# Patient Record
Sex: FEMALE | Race: WHITE | NOT HISPANIC OR LATINO | Employment: OTHER | URBAN - METROPOLITAN AREA
[De-identification: names, ages, dates, MRNs, and addresses within clinical notes are randomized per-mention and may not be internally consistent; named-entity substitution may affect disease eponyms.]

---

## 2019-08-31 ENCOUNTER — APPOINTMENT (OUTPATIENT)
Dept: CT IMAGING | Facility: HOSPITAL | Age: 74
End: 2019-08-31

## 2019-08-31 ENCOUNTER — HOSPITAL ENCOUNTER (INPATIENT)
Facility: HOSPITAL | Age: 74
LOS: 4 days | End: 2019-09-04
Attending: EMERGENCY MEDICINE | Admitting: HOSPITALIST

## 2019-08-31 ENCOUNTER — APPOINTMENT (OUTPATIENT)
Dept: GENERAL RADIOLOGY | Facility: HOSPITAL | Age: 74
End: 2019-08-31

## 2019-08-31 DIAGNOSIS — R44.3 HALLUCINATIONS: Primary | ICD-10-CM

## 2019-08-31 DIAGNOSIS — E87.20 METABOLIC ACIDOSIS: ICD-10-CM

## 2019-08-31 DIAGNOSIS — N17.9 ACUTE RENAL FAILURE, UNSPECIFIED ACUTE RENAL FAILURE TYPE (HCC): ICD-10-CM

## 2019-08-31 PROBLEM — R56.9 SEIZURES (HCC): Chronic | Status: ACTIVE | Noted: 2019-08-31

## 2019-08-31 PROBLEM — I25.10 CORONARY ARTERY DISEASE: Chronic | Status: ACTIVE | Noted: 2019-08-31

## 2019-08-31 PROBLEM — S09.90XS: Status: ACTIVE | Noted: 2019-08-31

## 2019-08-31 PROBLEM — E03.9 HYPOTHYROIDISM (ACQUIRED): Chronic | Status: ACTIVE | Noted: 2019-08-31

## 2019-08-31 PROBLEM — G89.29 CHRONIC BACK PAIN: Chronic | Status: ACTIVE | Noted: 2019-08-31

## 2019-08-31 PROBLEM — E87.1 HYPONATREMIA: Status: ACTIVE | Noted: 2019-08-31

## 2019-08-31 PROBLEM — M54.9 CHRONIC BACK PAIN: Chronic | Status: ACTIVE | Noted: 2019-08-31

## 2019-08-31 PROBLEM — R53.1 WEAKNESS: Status: ACTIVE | Noted: 2019-08-31

## 2019-08-31 PROBLEM — G93.41 METABOLIC ENCEPHALOPATHY: Status: ACTIVE | Noted: 2019-08-31

## 2019-08-31 LAB
ALBUMIN SERPL-MCNC: 4.6 G/DL (ref 3.5–5.2)
ALBUMIN/GLOB SERPL: 1.6 G/DL
ALP SERPL-CCNC: 94 U/L (ref 39–117)
ALT SERPL W P-5'-P-CCNC: 6 U/L (ref 1–33)
ANION GAP SERPL CALCULATED.3IONS-SCNC: 15.2 MMOL/L (ref 5–15)
ANION GAP SERPL CALCULATED.3IONS-SCNC: 18.1 MMOL/L (ref 5–15)
ARTERIAL PATENCY WRIST A: POSITIVE
ARTERIAL PATENCY WRIST A: POSITIVE
AST SERPL-CCNC: 16 U/L (ref 1–32)
ATMOSPHERIC PRESS: 752.6 MMHG
ATMOSPHERIC PRESS: 753.6 MMHG
BACTERIA UR QL AUTO: ABNORMAL /HPF
BASE EXCESS BLDA CALC-SCNC: -13.1 MMOL/L (ref 0–2)
BASE EXCESS BLDA CALC-SCNC: -13.3 MMOL/L (ref 0–2)
BASOPHILS # BLD AUTO: 0 10*3/MM3 (ref 0–0.2)
BASOPHILS NFR BLD AUTO: 0 % (ref 0–1.5)
BDY SITE: ABNORMAL
BDY SITE: ABNORMAL
BILIRUB SERPL-MCNC: 0.2 MG/DL (ref 0.2–1.2)
BILIRUB UR QL STRIP: NEGATIVE
BUN BLD-MCNC: 44 MG/DL (ref 8–23)
BUN BLD-MCNC: 48 MG/DL (ref 8–23)
BUN/CREAT SERPL: 16.8 (ref 7–25)
BUN/CREAT SERPL: 19.6 (ref 7–25)
CALCIUM SPEC-SCNC: 8.4 MG/DL (ref 8.6–10.5)
CALCIUM SPEC-SCNC: 9.2 MG/DL (ref 8.6–10.5)
CHLORIDE SERPL-SCNC: 102 MMOL/L (ref 98–107)
CHLORIDE SERPL-SCNC: 106 MMOL/L (ref 98–107)
CLARITY UR: CLEAR
CO2 SERPL-SCNC: 12.8 MMOL/L (ref 22–29)
CO2 SERPL-SCNC: 13.9 MMOL/L (ref 22–29)
COLOR UR: YELLOW
CREAT BLD-MCNC: 2.25 MG/DL (ref 0.57–1)
CREAT BLD-MCNC: 2.85 MG/DL (ref 0.57–1)
D-LACTATE SERPL-SCNC: 1.5 MMOL/L (ref 0.5–2)
DEPRECATED RDW RBC AUTO: 46.7 FL (ref 37–54)
EOSINOPHIL # BLD AUTO: 0 10*3/MM3 (ref 0–0.4)
EOSINOPHIL NFR BLD AUTO: 0 % (ref 0.3–6.2)
ERYTHROCYTE [DISTWIDTH] IN BLOOD BY AUTOMATED COUNT: 14.1 % (ref 12.3–15.4)
GFR SERPL CREATININE-BSD FRML MDRD: 16 ML/MIN/1.73
GFR SERPL CREATININE-BSD FRML MDRD: 21 ML/MIN/1.73
GLOBULIN UR ELPH-MCNC: 2.8 GM/DL
GLUCOSE BLD-MCNC: 101 MG/DL (ref 65–99)
GLUCOSE BLD-MCNC: 98 MG/DL (ref 65–99)
GLUCOSE UR STRIP-MCNC: NEGATIVE MG/DL
HCO3 BLDA-SCNC: 10.4 MMOL/L (ref 22–28)
HCO3 BLDA-SCNC: 11.1 MMOL/L (ref 22–28)
HCT VFR BLD AUTO: 43.7 % (ref 34–46.6)
HGB BLD-MCNC: 14.2 G/DL (ref 12–15.9)
HGB UR QL STRIP.AUTO: NEGATIVE
HYALINE CASTS UR QL AUTO: ABNORMAL /LPF
IMM GRANULOCYTES # BLD AUTO: 0.02 10*3/MM3 (ref 0–0.05)
IMM GRANULOCYTES NFR BLD AUTO: 0.3 % (ref 0–0.5)
KETONES UR QL STRIP: NEGATIVE
LEUKOCYTE ESTERASE UR QL STRIP.AUTO: NEGATIVE
LYMPHOCYTES # BLD AUTO: 1.22 10*3/MM3 (ref 0.7–3.1)
LYMPHOCYTES NFR BLD AUTO: 19.9 % (ref 19.6–45.3)
MAGNESIUM SERPL-MCNC: 1.9 MG/DL (ref 1.6–2.4)
MCH RBC QN AUTO: 29.2 PG (ref 26.6–33)
MCHC RBC AUTO-ENTMCNC: 32.5 G/DL (ref 31.5–35.7)
MCV RBC AUTO: 89.7 FL (ref 79–97)
MODALITY: ABNORMAL
MODALITY: ABNORMAL
MONOCYTES # BLD AUTO: 0.69 10*3/MM3 (ref 0.1–0.9)
MONOCYTES NFR BLD AUTO: 11.3 % (ref 5–12)
NEUTROPHILS # BLD AUTO: 4.19 10*3/MM3 (ref 1.7–7)
NEUTROPHILS NFR BLD AUTO: 68.5 % (ref 42.7–76)
NITRITE UR QL STRIP: NEGATIVE
NRBC BLD AUTO-RTO: 0 /100 WBC (ref 0–0.2)
PCO2 BLDA: 19.7 MM HG (ref 35–45)
PCO2 BLDA: 21.7 MM HG (ref 35–45)
PH BLDA: 7.32 PH UNITS (ref 7.35–7.45)
PH BLDA: 7.33 PH UNITS (ref 7.35–7.45)
PH UR STRIP.AUTO: 5.5 [PH] (ref 5–8)
PLATELET # BLD AUTO: 213 10*3/MM3 (ref 140–450)
PMV BLD AUTO: 10.9 FL (ref 6–12)
PO2 BLDA: 111.2 MM HG (ref 80–100)
PO2 BLDA: 94.6 MM HG (ref 80–100)
POTASSIUM BLD-SCNC: 4.2 MMOL/L (ref 3.5–5.2)
POTASSIUM BLD-SCNC: 5.1 MMOL/L (ref 3.5–5.2)
PROT SERPL-MCNC: 7.4 G/DL (ref 6–8.5)
PROT UR QL STRIP: ABNORMAL
RBC # BLD AUTO: 4.87 10*6/MM3 (ref 3.77–5.28)
RBC # UR: ABNORMAL /HPF
REF LAB TEST METHOD: ABNORMAL
SAO2 % BLDCOA: 97.1 % (ref 92–99)
SAO2 % BLDCOA: 98.1 % (ref 92–99)
SODIUM BLD-SCNC: 134 MMOL/L (ref 136–145)
SODIUM BLD-SCNC: 134 MMOL/L (ref 136–145)
SP GR UR STRIP: 1.02 (ref 1–1.03)
SQUAMOUS #/AREA URNS HPF: ABNORMAL /HPF
T4 FREE SERPL-MCNC: 1.7 NG/DL (ref 0.93–1.7)
TOTAL RATE: 16 BREATHS/MINUTE
TOTAL RATE: 16 BREATHS/MINUTE
TROPONIN T SERPL-MCNC: <0.01 NG/ML (ref 0–0.03)
TSH SERPL DL<=0.05 MIU/L-ACNC: 0.04 UIU/ML (ref 0.27–4.2)
UROBILINOGEN UR QL STRIP: ABNORMAL
WBC NRBC COR # BLD: 6.12 10*3/MM3 (ref 3.4–10.8)
WBC UR QL AUTO: ABNORMAL /HPF
YEAST URNS QL MICRO: ABNORMAL /HPF

## 2019-08-31 PROCEDURE — 80053 COMPREHEN METABOLIC PANEL: CPT | Performed by: EMERGENCY MEDICINE

## 2019-08-31 PROCEDURE — 83735 ASSAY OF MAGNESIUM: CPT | Performed by: EMERGENCY MEDICINE

## 2019-08-31 PROCEDURE — 36600 WITHDRAWAL OF ARTERIAL BLOOD: CPT

## 2019-08-31 PROCEDURE — 84484 ASSAY OF TROPONIN QUANT: CPT | Performed by: EMERGENCY MEDICINE

## 2019-08-31 PROCEDURE — 36415 COLL VENOUS BLD VENIPUNCTURE: CPT

## 2019-08-31 PROCEDURE — 81001 URINALYSIS AUTO W/SCOPE: CPT | Performed by: EMERGENCY MEDICINE

## 2019-08-31 PROCEDURE — 70450 CT HEAD/BRAIN W/O DYE: CPT

## 2019-08-31 PROCEDURE — 93005 ELECTROCARDIOGRAM TRACING: CPT | Performed by: EMERGENCY MEDICINE

## 2019-08-31 PROCEDURE — 84443 ASSAY THYROID STIM HORMONE: CPT | Performed by: EMERGENCY MEDICINE

## 2019-08-31 PROCEDURE — 84439 ASSAY OF FREE THYROXINE: CPT | Performed by: EMERGENCY MEDICINE

## 2019-08-31 PROCEDURE — 83605 ASSAY OF LACTIC ACID: CPT | Performed by: EMERGENCY MEDICINE

## 2019-08-31 PROCEDURE — 36415 COLL VENOUS BLD VENIPUNCTURE: CPT | Performed by: HOSPITALIST

## 2019-08-31 PROCEDURE — 71045 X-RAY EXAM CHEST 1 VIEW: CPT

## 2019-08-31 PROCEDURE — 82803 BLOOD GASES ANY COMBINATION: CPT

## 2019-08-31 PROCEDURE — 99284 EMERGENCY DEPT VISIT MOD MDM: CPT

## 2019-08-31 PROCEDURE — 93010 ELECTROCARDIOGRAM REPORT: CPT | Performed by: INTERNAL MEDICINE

## 2019-08-31 PROCEDURE — 85025 COMPLETE CBC W/AUTO DIFF WBC: CPT | Performed by: EMERGENCY MEDICINE

## 2019-08-31 RX ORDER — PRAMIPEXOLE DIHYDROCHLORIDE 0.12 MG/1
0.12 TABLET ORAL 2 TIMES DAILY
COMMUNITY
End: 2019-09-04 | Stop reason: HOSPADM

## 2019-08-31 RX ORDER — ACETAMINOPHEN 650 MG/1
650 SUPPOSITORY RECTAL EVERY 4 HOURS PRN
Status: DISCONTINUED | OUTPATIENT
Start: 2019-08-31 | End: 2019-09-04 | Stop reason: HOSPADM

## 2019-08-31 RX ORDER — ZONISAMIDE 100 MG/1
200 CAPSULE ORAL NIGHTLY
COMMUNITY

## 2019-08-31 RX ORDER — QUETIAPINE FUMARATE 25 MG/1
25 TABLET, FILM COATED ORAL NIGHTLY
Status: DISCONTINUED | OUTPATIENT
Start: 2019-08-31 | End: 2019-09-01

## 2019-08-31 RX ORDER — ALBUTEROL SULFATE 90 UG/1
2 AEROSOL, METERED RESPIRATORY (INHALATION) EVERY 6 HOURS PRN
COMMUNITY

## 2019-08-31 RX ORDER — ONDANSETRON 2 MG/ML
4 INJECTION INTRAMUSCULAR; INTRAVENOUS EVERY 6 HOURS PRN
Status: DISCONTINUED | OUTPATIENT
Start: 2019-08-31 | End: 2019-09-04 | Stop reason: HOSPADM

## 2019-08-31 RX ORDER — ZONISAMIDE 100 MG/1
200 CAPSULE ORAL NIGHTLY
Status: DISCONTINUED | OUTPATIENT
Start: 2019-08-31 | End: 2019-09-01

## 2019-08-31 RX ORDER — SODIUM CHLORIDE 0.9 % (FLUSH) 0.9 %
10 SYRINGE (ML) INJECTION EVERY 12 HOURS SCHEDULED
Status: DISCONTINUED | OUTPATIENT
Start: 2019-08-31 | End: 2019-09-04 | Stop reason: HOSPADM

## 2019-08-31 RX ORDER — ATORVASTATIN CALCIUM 10 MG/1
10 TABLET, FILM COATED ORAL DAILY
COMMUNITY

## 2019-08-31 RX ORDER — NITROGLYCERIN 0.4 MG/1
0.4 TABLET SUBLINGUAL
COMMUNITY

## 2019-08-31 RX ORDER — ASPIRIN 81 MG/1
81 TABLET ORAL DAILY
COMMUNITY

## 2019-08-31 RX ORDER — MONTELUKAST SODIUM 10 MG/1
10 TABLET ORAL NIGHTLY
COMMUNITY

## 2019-08-31 RX ORDER — CARVEDILOL 3.12 MG/1
3.12 TABLET ORAL 2 TIMES DAILY WITH MEALS
Status: DISCONTINUED | OUTPATIENT
Start: 2019-08-31 | End: 2019-09-03

## 2019-08-31 RX ORDER — SODIUM CHLORIDE 0.9 % (FLUSH) 0.9 %
10 SYRINGE (ML) INJECTION AS NEEDED
Status: DISCONTINUED | OUTPATIENT
Start: 2019-08-31 | End: 2019-09-04 | Stop reason: HOSPADM

## 2019-08-31 RX ORDER — LEVOTHYROXINE SODIUM 88 UG/1
88 TABLET ORAL
Status: DISCONTINUED | OUTPATIENT
Start: 2019-09-01 | End: 2019-09-01

## 2019-08-31 RX ORDER — SODIUM CHLORIDE 9 MG/ML
125 INJECTION, SOLUTION INTRAVENOUS CONTINUOUS
Status: DISCONTINUED | OUTPATIENT
Start: 2019-08-31 | End: 2019-09-01

## 2019-08-31 RX ORDER — SULFAMETHOXAZOLE AND TRIMETHOPRIM 800; 160 MG/1; MG/1
1 TABLET ORAL 2 TIMES DAILY
COMMUNITY
Start: 2019-08-28 | End: 2019-09-04 | Stop reason: HOSPADM

## 2019-08-31 RX ORDER — ATORVASTATIN CALCIUM 10 MG/1
10 TABLET, FILM COATED ORAL DAILY
Status: DISCONTINUED | OUTPATIENT
Start: 2019-09-01 | End: 2019-09-04 | Stop reason: HOSPADM

## 2019-08-31 RX ORDER — ACETAMINOPHEN 325 MG/1
650 TABLET ORAL EVERY 4 HOURS PRN
Status: DISCONTINUED | OUTPATIENT
Start: 2019-08-31 | End: 2019-09-04 | Stop reason: HOSPADM

## 2019-08-31 RX ORDER — QUETIAPINE FUMARATE 25 MG/1
25 TABLET, FILM COATED ORAL NIGHTLY
Status: ON HOLD | COMMUNITY
End: 2019-09-04 | Stop reason: SDUPTHER

## 2019-08-31 RX ORDER — ASPIRIN 81 MG/1
81 TABLET ORAL DAILY
Status: DISCONTINUED | OUTPATIENT
Start: 2019-09-01 | End: 2019-09-04 | Stop reason: HOSPADM

## 2019-08-31 RX ORDER — ACETAMINOPHEN 160 MG/5ML
650 SOLUTION ORAL EVERY 4 HOURS PRN
Status: DISCONTINUED | OUTPATIENT
Start: 2019-08-31 | End: 2019-09-04 | Stop reason: HOSPADM

## 2019-08-31 RX ORDER — CARVEDILOL 3.12 MG/1
3.12 TABLET ORAL 2 TIMES DAILY WITH MEALS
COMMUNITY
End: 2019-09-04 | Stop reason: HOSPADM

## 2019-08-31 RX ORDER — LEVOTHYROXINE SODIUM 0.1 MG/1
100 TABLET ORAL DAILY
COMMUNITY
End: 2019-09-04 | Stop reason: HOSPADM

## 2019-08-31 RX ORDER — ERGOCALCIFEROL 1.25 MG/1
50000 CAPSULE ORAL WEEKLY
COMMUNITY

## 2019-08-31 RX ORDER — SODIUM CHLORIDE 9 MG/ML
100 INJECTION, SOLUTION INTRAVENOUS CONTINUOUS
Status: DISCONTINUED | OUTPATIENT
Start: 2019-08-31 | End: 2019-09-02

## 2019-08-31 RX ORDER — POTASSIUM CHLORIDE 750 MG/1
20 CAPSULE, EXTENDED RELEASE ORAL DAILY
Status: DISCONTINUED | OUTPATIENT
Start: 2019-09-01 | End: 2019-09-04 | Stop reason: HOSPADM

## 2019-08-31 RX ORDER — POTASSIUM CHLORIDE 750 MG/1
20 TABLET, FILM COATED, EXTENDED RELEASE ORAL DAILY
COMMUNITY

## 2019-08-31 RX ORDER — VENLAFAXINE HYDROCHLORIDE 37.5 MG/1
37.5 CAPSULE, EXTENDED RELEASE ORAL DAILY
Status: DISCONTINUED | OUTPATIENT
Start: 2019-09-01 | End: 2019-09-01

## 2019-08-31 RX ORDER — PANTOPRAZOLE SODIUM 40 MG/1
40 TABLET, DELAYED RELEASE ORAL DAILY
COMMUNITY

## 2019-08-31 RX ADMIN — ZONISAMIDE 200 MG: 100 CAPSULE ORAL at 22:04

## 2019-08-31 RX ADMIN — QUETIAPINE FUMARATE 25 MG: 25 TABLET ORAL at 21:53

## 2019-08-31 RX ADMIN — SODIUM CHLORIDE, PRESERVATIVE FREE 10 ML: 5 INJECTION INTRAVENOUS at 20:34

## 2019-08-31 RX ADMIN — SODIUM CHLORIDE 500 ML: 9 INJECTION, SOLUTION INTRAVENOUS at 17:30

## 2019-08-31 RX ADMIN — CARVEDILOL 3.12 MG: 3.12 TABLET, FILM COATED ORAL at 21:53

## 2019-08-31 RX ADMIN — SODIUM CHLORIDE 125 ML/HR: 9 INJECTION, SOLUTION INTRAVENOUS at 20:33

## 2019-08-31 RX ADMIN — SODIUM CHLORIDE 125 ML/HR: 9 INJECTION, SOLUTION INTRAVENOUS at 17:30

## 2019-09-01 LAB
ALBUMIN SERPL-MCNC: 4.3 G/DL (ref 3.5–5.2)
ALBUMIN/GLOB SERPL: 2 G/DL
ALP SERPL-CCNC: 89 U/L (ref 39–117)
ALT SERPL W P-5'-P-CCNC: <5 U/L (ref 1–33)
ANION GAP SERPL CALCULATED.3IONS-SCNC: 10.5 MMOL/L (ref 5–15)
AST SERPL-CCNC: 12 U/L (ref 1–32)
BASOPHILS # BLD AUTO: 0 10*3/MM3 (ref 0–0.2)
BASOPHILS NFR BLD AUTO: 0 % (ref 0–1.5)
BILIRUB SERPL-MCNC: 0.2 MG/DL (ref 0.2–1.2)
BUN BLD-MCNC: 39 MG/DL (ref 8–23)
BUN/CREAT SERPL: 21.5 (ref 7–25)
CALCIUM SPEC-SCNC: 8.6 MG/DL (ref 8.6–10.5)
CHLORIDE SERPL-SCNC: 110 MMOL/L (ref 98–107)
CO2 SERPL-SCNC: 13.5 MMOL/L (ref 22–29)
CREAT BLD-MCNC: 1.81 MG/DL (ref 0.57–1)
DEPRECATED RDW RBC AUTO: 48.3 FL (ref 37–54)
EOSINOPHIL # BLD AUTO: 0 10*3/MM3 (ref 0–0.4)
EOSINOPHIL NFR BLD AUTO: 0 % (ref 0.3–6.2)
ERYTHROCYTE [DISTWIDTH] IN BLOOD BY AUTOMATED COUNT: 14.4 % (ref 12.3–15.4)
GFR SERPL CREATININE-BSD FRML MDRD: 28 ML/MIN/1.73
GLOBULIN UR ELPH-MCNC: 2.1 GM/DL
GLUCOSE BLD-MCNC: 92 MG/DL (ref 65–99)
HCT VFR BLD AUTO: 39.4 % (ref 34–46.6)
HGB BLD-MCNC: 12.5 G/DL (ref 12–15.9)
IMM GRANULOCYTES # BLD AUTO: 0.02 10*3/MM3 (ref 0–0.05)
IMM GRANULOCYTES NFR BLD AUTO: 0.4 % (ref 0–0.5)
LYMPHOCYTES # BLD AUTO: 1.19 10*3/MM3 (ref 0.7–3.1)
LYMPHOCYTES NFR BLD AUTO: 24.1 % (ref 19.6–45.3)
MCH RBC QN AUTO: 29.1 PG (ref 26.6–33)
MCHC RBC AUTO-ENTMCNC: 31.7 G/DL (ref 31.5–35.7)
MCV RBC AUTO: 91.8 FL (ref 79–97)
MONOCYTES # BLD AUTO: 0.58 10*3/MM3 (ref 0.1–0.9)
MONOCYTES NFR BLD AUTO: 11.8 % (ref 5–12)
NEUTROPHILS # BLD AUTO: 3.14 10*3/MM3 (ref 1.7–7)
NEUTROPHILS NFR BLD AUTO: 63.7 % (ref 42.7–76)
NRBC BLD AUTO-RTO: 0 /100 WBC (ref 0–0.2)
PLATELET # BLD AUTO: 183 10*3/MM3 (ref 140–450)
PMV BLD AUTO: 11.1 FL (ref 6–12)
POTASSIUM BLD-SCNC: 4.3 MMOL/L (ref 3.5–5.2)
PROT SERPL-MCNC: 6.4 G/DL (ref 6–8.5)
RBC # BLD AUTO: 4.29 10*6/MM3 (ref 3.77–5.28)
SODIUM BLD-SCNC: 134 MMOL/L (ref 136–145)
WBC NRBC COR # BLD: 4.93 10*3/MM3 (ref 3.4–10.8)

## 2019-09-01 PROCEDURE — 99223 1ST HOSP IP/OBS HIGH 75: CPT | Performed by: PSYCHIATRY & NEUROLOGY

## 2019-09-01 PROCEDURE — 80053 COMPREHEN METABOLIC PANEL: CPT | Performed by: HOSPITALIST

## 2019-09-01 PROCEDURE — 85025 COMPLETE CBC W/AUTO DIFF WBC: CPT | Performed by: HOSPITALIST

## 2019-09-01 RX ORDER — LEVOTHYROXINE SODIUM 88 UG/1
88 TABLET ORAL
Status: DISCONTINUED | OUTPATIENT
Start: 2019-09-02 | End: 2019-09-04 | Stop reason: HOSPADM

## 2019-09-01 RX ORDER — ARIPIPRAZOLE 5 MG/1
5 TABLET ORAL
Status: DISCONTINUED | OUTPATIENT
Start: 2019-09-01 | End: 2019-09-02

## 2019-09-01 RX ORDER — LEVOTHYROXINE SODIUM 0.07 MG/1
75 TABLET ORAL
Status: DISCONTINUED | OUTPATIENT
Start: 2019-09-02 | End: 2019-09-01

## 2019-09-01 RX ADMIN — SODIUM CHLORIDE, PRESERVATIVE FREE 10 ML: 5 INJECTION INTRAVENOUS at 20:16

## 2019-09-01 RX ADMIN — ARIPIPRAZOLE 5 MG: 5 TABLET ORAL at 20:16

## 2019-09-01 RX ADMIN — ACETAMINOPHEN 650 MG: 325 TABLET, FILM COATED ORAL at 08:35

## 2019-09-01 RX ADMIN — CARVEDILOL 3.12 MG: 3.12 TABLET, FILM COATED ORAL at 17:58

## 2019-09-01 RX ADMIN — SODIUM CHLORIDE 125 ML/HR: 9 INJECTION, SOLUTION INTRAVENOUS at 06:08

## 2019-09-01 RX ADMIN — POTASSIUM CHLORIDE 20 MEQ: 750 CAPSULE, EXTENDED RELEASE ORAL at 08:32

## 2019-09-01 RX ADMIN — ASPIRIN 81 MG: 81 TABLET, COATED ORAL at 08:33

## 2019-09-01 RX ADMIN — CARVEDILOL 3.12 MG: 3.12 TABLET, FILM COATED ORAL at 08:32

## 2019-09-01 RX ADMIN — SODIUM CHLORIDE, PRESERVATIVE FREE 10 ML: 5 INJECTION INTRAVENOUS at 09:09

## 2019-09-01 RX ADMIN — ATORVASTATIN CALCIUM 10 MG: 10 TABLET, FILM COATED ORAL at 08:32

## 2019-09-01 RX ADMIN — VENLAFAXINE HYDROCHLORIDE 37.5 MG: 37.5 CAPSULE, EXTENDED RELEASE ORAL at 08:33

## 2019-09-01 RX ADMIN — SODIUM CHLORIDE 100 ML/HR: 9 INJECTION, SOLUTION INTRAVENOUS at 14:32

## 2019-09-01 RX ADMIN — LEVOTHYROXINE SODIUM 88 MCG: 88 TABLET ORAL at 06:08

## 2019-09-01 NOTE — PLAN OF CARE
Problem: Patient Care Overview  Goal: Plan of Care Review  Outcome: Ongoing (interventions implemented as appropriate)   09/01/19 2369   Coping/Psychosocial   Plan of Care Reviewed With patient   Plan of Care Review   Progress improving   OTHER   Outcome Summary Pt was found to have UTI and placed on Bactrim 3 days ago (by her PCP) about the same time hallucinations started. Pt back to base line with improving labs. will CTM and provide care.

## 2019-09-01 NOTE — CONSULTS
Patient Identification:  NAME:  Lana Frankel  Age:  73 y.o.   Sex:  female   :  1945   MRN:  [id number]       Chief complaint: Does not have one/ reason for consult hallucinations    History of present illness: This patient is 73-year-old right-handed white female with history of seizure supposedly in the past myocardial infarction coronary artery disease she comes in after being found confused she apparently has had a fall striking the front part of her head.  She had a CT by my independent I will review it is normal the head injury is about a week ago she has been having hallucinations quality paranoia and hallucinations duration at least the last several days if not for a week or so.  The daughter thing thinks it may have come on after the head injury although it is not clear what came first the confusion or the fall.  Locations not pertinent quality as described duration presumably over this last week modifying factors so far nothing.      Past medical history:  Past Medical History:   Diagnosis Date   • Chronic back pain    • Coronary artery disease    • Disease of thyroid gland    • Myocardial infarction (CMS/HCC)    • Seizures (CMS/HCC)        Past surgical history:  Past Surgical History:   Procedure Laterality Date   • CHOLECYSTECTOMY     • HYSTERECTOMY     • NASAL SEPTUM SURGERY     • REPLACEMENT TOTAL KNEE     • THYROID SURGERY         Allergies:  Penicillins    Home medications:  Medications Prior to Admission   Medication Sig Dispense Refill Last Dose   • albuterol sulfate  (90 Base) MCG/ACT inhaler Inhale 2 puffs Every 6 (Six) Hours As Needed for Wheezing.      • aspirin 81 MG EC tablet Take 81 mg by mouth Daily.      • atorvastatin (LIPITOR) 10 MG tablet Take 10 mg by mouth Daily.      • carvedilol (COREG) 3.125 MG tablet Take 3.125 mg by mouth 2 (Two) Times a Day With Meals.      • ergocalciferol (DRISDOL) 8000 UNIT/ML drops Take  by mouth Daily.      • levothyroxine (SYNTHROID,  LEVOTHROID) 100 MCG tablet Take 100 mcg by mouth Daily.      • montelukast (SINGULAIR) 10 MG tablet Take 10 mg by mouth Every Night.      • nitroglycerin (NITROSTAT) 0.4 MG SL tablet Place 0.4 mg under the tongue Every 5 (Five) Minutes As Needed for Chest Pain. Take no more than 3 doses in 15 minutes.      • pantoprazole (PROTONIX) 40 MG EC tablet Take 40 mg by mouth Daily.      • potassium chloride (K-DUR,KLOR-CON) 10 MEQ ER tablet Take 20 mEq by mouth Daily.      • pramipexole (MIRAPEX) 0.125 MG tablet Take 0.125 mg by mouth 2 (Two) Times a Day.      • QUEtiapine (SEROquel) 25 MG tablet Take 25 mg by mouth Every Night.      • sulfamethoxazole-trimethoprim (BACTRIM DS,SEPTRA DS) 800-160 MG per tablet Take 1 tablet by mouth 2 (Two) Times a Day.      • VENLAFAXINE HCL ER PO Take 50 mg by mouth Daily.      • vitamin D (ERGOCALCIFEROL) 08584 units capsule capsule Take 50,000 Units by mouth 1 (One) Time Per Week.      • zonisamide (ZONEGRAN) 100 MG capsule Take 200 mg by mouth Every Night.           Hospital medications:    ARIPiprazole 5 mg Oral Daily With Dinner   aspirin 81 mg Oral Daily   atorvastatin 10 mg Oral Daily   carvedilol 3.125 mg Oral BID With Meals   [START ON 9/2/2019] levothyroxine 88 mcg Oral Q AM   potassium chloride 20 mEq Oral Daily   sodium chloride 10 mL Intravenous Q12H       sodium chloride 100 mL/hr Last Rate: 100 mL/hr (09/01/19 1432)     •  acetaminophen **OR** acetaminophen **OR** acetaminophen  •  ondansetron  •  sodium chloride    Family history:  History reviewed. No pertinent family history.    Social history:  Social History     Tobacco Use   • Smoking status: Never Smoker   Substance Use Topics   • Alcohol use: No     Frequency: Never   • Drug use: No       Review of systems:    She does not really have any complaints she is a little confused and will not really give an adequate review of systems.  She denies headache hair eyes ears nose throat skin bone joint  lymphatic hematologic  or oncologic complaints no neck pain chest pain abdominal pain bowel bladder incontinence fever chills or rash.  Her daughter is here to concur with most of this she has hyperlipidemia    Objective:  Vitals Ranges:   Temp:  [97.3 °F (36.3 °C)-98.4 °F (36.9 °C)] 97.6 °F (36.4 °C)  Heart Rate:  [69-85] 73  Resp:  [16-18] 16  BP: (125-161)/(54-90) 161/84      Physical Exam:  Awake alert and oriented x2 fund of knowledge poor attention span concentration fair recent remote memory poor language function normal elderly in no distress.  Pupils 1-1/2 constricting to 1 disc retinas were impossible to follow extraocular movements at least appear to be full horizontally in both directions upgaze is okay as well visual fields probably full although she has trouble following that nasolabial folds palate tongue symmetrical she would not follow any other cranial nerve testing.  She does appear to be slightly confused but answered most questions appropriately motor 5 out of 5 x 4 extremities no atrophy fasciculations rigidity bradykinesia reflexes trace throughout symmetrical toes downgoing bilaterally sensation coordinate sensation normal light touch face both arms both legs coordination normal in the upper extremity Station and gait is normal heart regular without murmur neck supple without bruits extremities no clubbing cyanosis edema visual acuity she was able to count fingers at 3 feet    Results review:   I reviewed the patient's new clinical results.    Data review:  Lab Results (last 24 hours)     Procedure Component Value Units Date/Time    Comprehensive Metabolic Panel [[id number]]  (Abnormal) Collected:  09/01/19 0541    Specimen:  Blood Updated:  09/01/19 0630     Glucose 92 mg/dL      BUN 39 mg/dL      Creatinine 1.81 mg/dL      Sodium 134 mmol/L      Potassium 4.3 mmol/L      Chloride 110 mmol/L      CO2 13.5 mmol/L      Calcium 8.6 mg/dL      Total Protein 6.4 g/dL      Albumin 4.30 g/dL      ALT (SGPT) <5 U/L       AST (SGOT) 12 U/L      Alkaline Phosphatase 89 U/L      Total Bilirubin 0.2 mg/dL      eGFR Non African Amer 28 mL/min/1.73      Globulin 2.1 gm/dL      A/G Ratio 2.0 g/dL      BUN/Creatinine Ratio 21.5     Anion Gap 10.5 mmol/L     Narrative:       GFR Normal >60  Chronic Kidney Disease <60  Kidney Failure <15    CBC Auto Differential [[id number]]  (Abnormal) Collected:  09/01/19 0541    Specimen:  Blood Updated:  09/01/19 0620     WBC 4.93 10*3/mm3      RBC 4.29 10*6/mm3      Hemoglobin 12.5 g/dL      Hematocrit 39.4 %      MCV 91.8 fL      MCH 29.1 pg      MCHC 31.7 g/dL      RDW 14.4 %      RDW-SD 48.3 fl      MPV 11.1 fL      Platelets 183 10*3/mm3      Neutrophil % 63.7 %      Lymphocyte % 24.1 %      Monocyte % 11.8 %      Eosinophil % 0.0 %      Basophil % 0.0 %      Immature Grans % 0.4 %      Neutrophils, Absolute 3.14 10*3/mm3      Lymphocytes, Absolute 1.19 10*3/mm3      Monocytes, Absolute 0.58 10*3/mm3      Eosinophils, Absolute 0.00 10*3/mm3      Basophils, Absolute 0.00 10*3/mm3      Immature Grans, Absolute 0.02 10*3/mm3      nRBC 0.0 /100 WBC     Blood Gas, Arterial [[id number]]  (Abnormal) Collected:  08/31/19 2348    Specimen:  Arterial Blood Updated:  08/31/19 2351     Site Arterial: right radial     Jae's Test Positive     pH, Arterial 7.316 pH units      pCO2, Arterial 21.7 mm Hg      pO2, Arterial 111.2 mm Hg      HCO3, Arterial 11.1 mmol/L      Base Excess, Arterial -13.3 mmol/L      O2 Saturation Calculated 98.1 %      Barometric Pressure for Blood Gas 753.6 mmHg      Modality Room Air     Rate 16 Breaths/minute     Basic Metabolic Panel [[id number]]  (Abnormal) Collected:  08/31/19 2253    Specimen:  Blood Updated:  08/31/19 2323     Glucose 98 mg/dL      BUN 44 mg/dL      Creatinine 2.25 mg/dL      Sodium 134 mmol/L      Potassium 4.2 mmol/L      Chloride 106 mmol/L      CO2 12.8 mmol/L      Calcium 8.4 mg/dL      eGFR Non African Amer 21 mL/min/1.73      BUN/Creatinine Ratio 19.6      Anion Gap 15.2 mmol/L     Narrative:       GFR Normal >60  Chronic Kidney Disease <60  Kidney Failure <15           Imaging:  Imaging Results (last 24 hours)     ** No results found for the last 24 hours. **             Assessment and Plan:       CECILE (acute kidney injury) (CMS/HCC)    Hallucinations    Chronic back pain    Coronary artery disease    Hypothyroidism (acquired)    Seizures (CMS/HCC)    Hyponatremia    Metabolic acidosis    Head injury, acute, sequela    Weakness    Metabolic encephalopathy    Metabolic encephalopathy with associated confusion and hallucinations.  She is also postconcussive it is hard to know if she was confused at home and then fell or became more confused after the fall.  She has had a CT of the head which by my independent eyeball review shows no acute stroke and no hemorrhage.  We will discontinue the sonogram discontinue the Effexor and give her Abilify 5 mg at supper and see what she looks like tomorrow thank you      Miles Tinoco MD  09/01/19  6:06 PM

## 2019-09-01 NOTE — PLAN OF CARE
Problem: Patient Care Overview  Goal: Plan of Care Review  Outcome: Ongoing (interventions implemented as appropriate)   09/01/19 1649   Coping/Psychosocial   Plan of Care Reviewed With patient   Plan of Care Review   Progress no change   OTHER   Outcome Summary Pt. admitted for hallucinations, vss, alert and oriented, however still having quite a few hallucinations. Tactile, olfactory, and visual. Will continue to monitor.       Problem: Fall Risk (Adult)  Goal: Identify Related Risk Factors and Signs and Symptoms  Outcome: Outcome(s) achieved Date Met: 09/01/19 09/01/19 1649   Fall Risk (Adult)   Related Risk Factors (Fall Risk) gait/mobility problems;history of falls;environment unfamiliar   Signs and Symptoms (Fall Risk) presence of risk factors     Goal: Absence of Fall  Outcome: Ongoing (interventions implemented as appropriate)   09/01/19 1649   Fall Risk (Adult)   Absence of Fall making progress toward outcome       Problem: Pain, Chronic (Adult)  Goal: Identify Related Risk Factors and Signs and Symptoms  Outcome: Outcome(s) achieved Date Met: 09/01/19 09/01/19 1649   Pain, Chronic (Adult)   Related Risk Factors (Chronic Pain) disease process   Signs and Symptoms (Chronic Pain) verbalization of pain descriptors     Goal: Acceptable Pain/Comfort Level and Functional Ability  Outcome: Ongoing (interventions implemented as appropriate)   09/01/19 1649   Pain, Chronic (Adult)   Acceptable Pain/Comfort Level and Functional Ability making progress toward outcome       Problem: Anxiety (Adult)  Goal: Identify Related Risk Factors and Signs and Symptoms  Outcome: Outcome(s) achieved Date Met: 09/01/19 09/01/19 1649   Anxiety (Adult)   Related Risk Factors (Anxiety) cognitive status;psychosocial factor   Signs and Symptoms (Anxiety) nervousness/tension/restlessness     Goal: Reduction/Resolution  Outcome: Ongoing (interventions implemented as appropriate)   09/01/19 1649   Anxiety (Adult)    Reduction/Resolution making progress toward outcome

## 2019-09-01 NOTE — PROGRESS NOTES
" LOS: 1 day     Name: Lana Frankel  Age: 73 y.o.  Sex: female  :  1945  MRN: [id number]         Primary Care Physician: System, Provider Not In    Subjective   Subjective  Nurse reports that she had some mild hallucinations this morning.  She is awake, alert, and oriented x3 for me at present.  She admits that her hallucinations have not fully resolved but are much better than prior.    Objective   Vital Signs  Temp:  [97.3 °F (36.3 °C)-98.6 °F (37 °C)] 97.6 °F (36.4 °C)  Heart Rate:  [69-99] 80  Resp:  [16-18] 16  BP: (116-137)/(54-90) 137/85  Body mass index is 28.41 kg/m².    Objective:  General Appearance:  Comfortable and in no acute distress (Elderly and frail appearing.  Appears older than stated age.).    Vital signs: (most recent): Blood pressure 137/85, pulse 80, temperature 97.6 °F (36.4 °C), temperature source Oral, resp. rate 16, height 167.6 cm (66\"), weight 79.8 kg (176 lb), SpO2 98 %.    Lungs:  Normal effort and normal respiratory rate.    Heart: Normal rate.  Regular rhythm.    Abdomen: Abdomen is soft.  Bowel sounds are normal.   There is no abdominal tenderness.     Extremities: There is no dependent edema or local swelling.    Neurological: Patient is alert and oriented to person, place and time.    Skin:  Warm and dry.              Results Review:       I reviewed the patient's new clinical results.    Results from last 7 days   Lab Units 19  0541 19  1444 19  1318   WBC 10*3/mm3 4.93 6.12 5.27   HEMOGLOBIN g/dL 12.5 14.2 14.0   PLATELETS 10*3/mm3 183 213 211     Results from last 7 days   Lab Units 19  0541 19  2253 19  1444 19  1318   SODIUM mmol/L 134* 134* 134* 141   POTASSIUM mmol/L 4.3 4.2 5.1 4.2   CHLORIDE mmol/L 110* 106 102 107   TOTAL CO2 mmol/L  --   --   --  18*   CO2 mmol/L 13.5* 12.8* 13.9*  --    BUN mg/dL 39* 44* 48* 30*   CREATININE mg/dL 1.81* 2.25* 2.85* 1.6*   CALCIUM mg/dL 8.6 8.4* 9.2 9.9   GLUCOSE mg/dL 92 98 101*  --  "                 Scheduled Meds:     aspirin 81 mg Oral Daily   atorvastatin 10 mg Oral Daily   carvedilol 3.125 mg Oral BID With Meals   levothyroxine 88 mcg Oral Q AM   potassium chloride 20 mEq Oral Daily   QUEtiapine 25 mg Oral Nightly   sodium chloride 10 mL Intravenous Q12H   venlafaxine XR 37.5 mg Oral Daily   zonisamide 200 mg Oral Nightly     PRN Meds:   •  acetaminophen **OR** acetaminophen **OR** acetaminophen  •  ondansetron  •  sodium chloride  Continuous Infusions:    sodium chloride 100 mL/hr Last Rate: 100 mL/hr (09/01/19 1031)       Assessment/Plan   Active Hospital Problems    Diagnosis  POA   • **CECILE (acute kidney injury) (CMS/HCC) [N17.9]  Yes   • Hallucinations [R44.3]  Yes   • Chronic back pain [M54.9, G89.29]  Yes   • Coronary artery disease [I25.10]  Yes   • Hypothyroidism (acquired) [E03.9]  Yes   • Seizures (CMS/HCC) [R56.9]  Yes   • Hyponatremia [E87.1]  Yes   • Metabolic acidosis [E87.2]  Yes   • Head injury, acute, sequela [S09.90XS]  Not Applicable   • Weakness [R53.1]  Yes   • Metabolic encephalopathy [G93.41]  Yes      Resolved Hospital Problems   No resolved problems to display.       Assessment & Plan    -Acute kidney injury is improving with IV fluids.  Continue fluids today but decrease the rate a little bit.  -Agree that her hallucinations and confusion is likely multifactorial due to possible postconcussive syndrome along with acidosis, acute kidney injury, and any element of urinary tract infection in the outpatient setting.  Subsequently, the Bactrim could have been compounding the situation.  -Neurology has been consulted  -No need for any antibiotics at this time  -TSH was depressed and her Synthroid dose has been decreased  -Get her up and move around with physical therapy  -If continues to improve she might be ready for discharge as soon as tomorrow    Hank Comer MD  [facility]  09/01/19  11:46 AM

## 2019-09-02 LAB
ANION GAP SERPL CALCULATED.3IONS-SCNC: 13.5 MMOL/L (ref 5–15)
BUN BLD-MCNC: 20 MG/DL (ref 8–23)
BUN/CREAT SERPL: 24.7 (ref 7–25)
CALCIUM SPEC-SCNC: 8.6 MG/DL (ref 8.6–10.5)
CHLORIDE SERPL-SCNC: 113 MMOL/L (ref 98–107)
CO2 SERPL-SCNC: 15.5 MMOL/L (ref 22–29)
CREAT BLD-MCNC: 0.81 MG/DL (ref 0.57–1)
DEPRECATED RDW RBC AUTO: 48.6 FL (ref 37–54)
ERYTHROCYTE [DISTWIDTH] IN BLOOD BY AUTOMATED COUNT: 14.5 % (ref 12.3–15.4)
GFR SERPL CREATININE-BSD FRML MDRD: 70 ML/MIN/1.73
GLUCOSE BLD-MCNC: 89 MG/DL (ref 65–99)
HCT VFR BLD AUTO: 37.3 % (ref 34–46.6)
HGB BLD-MCNC: 11.9 G/DL (ref 12–15.9)
MCH RBC QN AUTO: 29.2 PG (ref 26.6–33)
MCHC RBC AUTO-ENTMCNC: 31.9 G/DL (ref 31.5–35.7)
MCV RBC AUTO: 91.4 FL (ref 79–97)
PLATELET # BLD AUTO: 165 10*3/MM3 (ref 140–450)
PMV BLD AUTO: 10.7 FL (ref 6–12)
POTASSIUM BLD-SCNC: 4.2 MMOL/L (ref 3.5–5.2)
RBC # BLD AUTO: 4.08 10*6/MM3 (ref 3.77–5.28)
SODIUM BLD-SCNC: 142 MMOL/L (ref 136–145)
WBC NRBC COR # BLD: 4 10*3/MM3 (ref 3.4–10.8)

## 2019-09-02 PROCEDURE — 80048 BASIC METABOLIC PNL TOTAL CA: CPT | Performed by: INTERNAL MEDICINE

## 2019-09-02 PROCEDURE — 25010000002 ONDANSETRON PER 1 MG: Performed by: HOSPITALIST

## 2019-09-02 PROCEDURE — 99233 SBSQ HOSP IP/OBS HIGH 50: CPT | Performed by: NURSE PRACTITIONER

## 2019-09-02 PROCEDURE — 85027 COMPLETE CBC AUTOMATED: CPT | Performed by: INTERNAL MEDICINE

## 2019-09-02 PROCEDURE — 90791 PSYCH DIAGNOSTIC EVALUATION: CPT | Performed by: MARRIAGE & FAMILY THERAPIST

## 2019-09-02 RX ORDER — QUETIAPINE FUMARATE 50 MG/1
50 TABLET, FILM COATED ORAL ONCE
Status: COMPLETED | OUTPATIENT
Start: 2019-09-02 | End: 2019-09-02

## 2019-09-02 RX ORDER — QUETIAPINE FUMARATE 100 MG/1
100 TABLET, FILM COATED ORAL NIGHTLY
Status: DISCONTINUED | OUTPATIENT
Start: 2019-09-02 | End: 2019-09-04 | Stop reason: HOSPADM

## 2019-09-02 RX ORDER — OLANZAPINE 5 MG/1
5 TABLET ORAL EVERY 12 HOURS PRN
Status: DISCONTINUED | OUTPATIENT
Start: 2019-09-02 | End: 2019-09-04 | Stop reason: HOSPADM

## 2019-09-02 RX ADMIN — POTASSIUM CHLORIDE 20 MEQ: 750 CAPSULE, EXTENDED RELEASE ORAL at 08:15

## 2019-09-02 RX ADMIN — QUETIAPINE FUMARATE 50 MG: 50 TABLET, FILM COATED ORAL at 13:23

## 2019-09-02 RX ADMIN — CARVEDILOL 3.12 MG: 3.12 TABLET, FILM COATED ORAL at 17:22

## 2019-09-02 RX ADMIN — ASPIRIN 81 MG: 81 TABLET, COATED ORAL at 08:15

## 2019-09-02 RX ADMIN — CARVEDILOL 3.12 MG: 3.12 TABLET, FILM COATED ORAL at 08:14

## 2019-09-02 RX ADMIN — ATORVASTATIN CALCIUM 10 MG: 10 TABLET, FILM COATED ORAL at 08:15

## 2019-09-02 RX ADMIN — ONDANSETRON 4 MG: 2 INJECTION INTRAMUSCULAR; INTRAVENOUS at 11:08

## 2019-09-02 RX ADMIN — SODIUM CHLORIDE, PRESERVATIVE FREE 10 ML: 5 INJECTION INTRAVENOUS at 20:31

## 2019-09-02 RX ADMIN — OLANZAPINE 5 MG: 5 TABLET, FILM COATED ORAL at 21:18

## 2019-09-02 RX ADMIN — LEVOTHYROXINE SODIUM 88 MCG: 88 TABLET ORAL at 07:07

## 2019-09-02 RX ADMIN — SODIUM CHLORIDE, PRESERVATIVE FREE 10 ML: 5 INJECTION INTRAVENOUS at 08:15

## 2019-09-02 RX ADMIN — QUETIAPINE FUMARATE 100 MG: 100 TABLET ORAL at 20:30

## 2019-09-02 NOTE — NURSING NOTE
Patient is very angry and crying inconsolably. She is disoriented and having visual hallucinations. Notified Dr. Comer.
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
## 2019-09-02 NOTE — PROGRESS NOTES
"DOS: 2019  NAME: Lana Frankel   : 1945  PCP: System, Provider Not In  Chief Complaint   Patient presents with   • Hallucinations     Neurology     Subjective: Continues to have hallucinations/delusions has become combative with staff.  When I inquired about patient's Seroquel she came very tearful and informed me that she has been seeing a psychiatrist, Dr López Lund, for depression since she lost her mother and brother this time last year.  She states she was started on Seroquel 3 to 4 weeks ago for insomnia and that it did not really help so it was just stopped by her psychiatrist.  She is also on venlafaxine 50 mg daily and Mirapex as an outpatient.    She tells me that her  is a  and he is working overtime this weekend so he took her to a [employer] to stay for 2 nights and there were multiple cats there was unable to get dressed and leave due to all the cat for on her clothing.    Objective:  Vital signs: /89 (BP Location: Left arm, Patient Position: Lying)   Pulse 78   Temp 97.5 °F (36.4 °C) (Oral)   Resp 16   Ht 167.6 cm (66\")   Wt 81.5 kg (179 lb 9.6 oz)   SpO2 98%   BMI 28.99 kg/m²       General appearance: Well developed, well nourished, well groomed, alert and cooperative. Tearful. Anxious.  HEENT: Normocephalic.   Neck and spine: Normal range of motion. Normal alignment. No mass or tenderness.    Cardiac: Regular rate and rhythm. No murmurs.   Peripheral Vasculature: Radial pulses are equal and symmetric.  Chest Exam: Clear to auscultation bilaterally, no wheezes, no rhonchi.  Extremities: Normal, no edema.   Skin: No rashes or birthmarks.     Higher integrative function: Oriented to self, month, year, current president, location.  Being intact, no dysarthria.  CN II: Normal visual fields.   CN III IV VI: Extraocular movements are full without nystagmus. Pupils are equal, round, and reactive to light.   CN V: Normal facial sensation.  CN VII: Facial movements " are symmetric, no weakness.   CN VIII: Auditory acuity is normal.   CN IX & X: Symmetric palatal movement.   CN XI: Sternocleidomastoid and trapezius are normal. No weakness.   CN XII: The tongue is midline. No atrophy or fasciculations.   Motor: Normal muscle strength, bulk, and tone in upper and lower extremities. No fasciculations, rigidity, spasticity or abnormal movements.   Sensation: Normal light touch.  Station and gait: Not assessed.  Muscle stretch reflexes: Reflexes are normal in upper extremity, decreased in lower extremities. Plantar reflexes are flexor bilaterally.   Coordination: Finger to nose test showed no dysmetria. Rapid alternating movements were normal. Heel to shin normal.     Scheduled Meds:  ARIPiprazole 5 mg Oral Daily With Dinner   aspirin 81 mg Oral Daily   atorvastatin 10 mg Oral Daily   carvedilol 3.125 mg Oral BID With Meals   levothyroxine 88 mcg Oral Q AM   potassium chloride 20 mEq Oral Daily   sodium chloride 10 mL Intravenous Q12H     Continuous Infusions:   PRN Meds:.•  acetaminophen **OR** acetaminophen **OR** acetaminophen  •  ondansetron  •  sodium chloride    No current facility-administered medications on file prior to encounter.      Current Outpatient Medications on File Prior to Encounter   Medication Sig   • albuterol sulfate  (90 Base) MCG/ACT inhaler Inhale 2 puffs Every 6 (Six) Hours As Needed for Wheezing.   • aspirin 81 MG EC tablet Take 81 mg by mouth Daily.   • atorvastatin (LIPITOR) 10 MG tablet Take 10 mg by mouth Daily.   • carvedilol (COREG) 3.125 MG tablet Take 3.125 mg by mouth 2 (Two) Times a Day With Meals.   • ergocalciferol (DRISDOL) 8000 UNIT/ML drops Take  by mouth Daily.   • levothyroxine (SYNTHROID, LEVOTHROID) 100 MCG tablet Take 100 mcg by mouth Daily.   • montelukast (SINGULAIR) 10 MG tablet Take 10 mg by mouth Every Night.   • nitroglycerin (NITROSTAT) 0.4 MG SL tablet Place 0.4 mg under the tongue Every 5 (Five) Minutes As Needed for  Chest Pain. Take no more than 3 doses in 15 minutes.   • pantoprazole (PROTONIX) 40 MG EC tablet Take 40 mg by mouth Daily.   • potassium chloride (K-DUR,KLOR-CON) 10 MEQ ER tablet Take 20 mEq by mouth Daily.   • pramipexole (MIRAPEX) 0.125 MG tablet Take 0.125 mg by mouth 2 (Two) Times a Day.   • QUEtiapine (SEROquel) 25 MG tablet Take 25 mg by mouth Every Night.   • sulfamethoxazole-trimethoprim (BACTRIM DS,SEPTRA DS) 800-160 MG per tablet Take 1 tablet by mouth 2 (Two) Times a Day.   • VENLAFAXINE HCL ER PO Take 50 mg by mouth Daily.   • vitamin D (ERGOCALCIFEROL) 49317 units capsule capsule Take 50,000 Units by mouth 1 (One) Time Per Week.   • zonisamide (ZONEGRAN) 100 MG capsule Take 200 mg by mouth Every Night.       Laboratory results:  Lab Results   Component Value Date    GLUCOSE 89 09/02/2019    CALCIUM 8.6 09/02/2019     09/02/2019    K 4.2 09/02/2019    CO2 15.5 (L) 09/02/2019     (H) 09/02/2019    BUN 20 09/02/2019    CREATININE 0.81 09/02/2019    EGFRIFNONA 70 09/02/2019    BCR 24.7 09/02/2019    ANIONGAP 13.5 09/02/2019     Lab Results   Component Value Date    WBC 4.00 09/02/2019    HGB 11.9 (L) 09/02/2019    HCT 37.3 09/02/2019    MCV 91.4 09/02/2019     09/02/2019     No results found for: CHOL  Lab Results   Component Value Date     06/11/2019    HDL 96 04/03/2019    HDL 81 12/05/2018     Lab Results   Component Value Date    LDL 61 06/11/2019    LDL 65 04/03/2019    LDL 49 12/05/2018     Lab Results   Component Value Date    TRIG 60 06/11/2019    TRIG 55 04/03/2019    TRIG 66 12/05/2018     @[id number]@   Review and interpretation of imaging: CT head images viewed by me, no acute findings.  CT HEAD WO CONTRAST-     INDICATIONS: Altered mental status     TECHNIQUE: Radiation dose reduction techniques were utilized, including  automated exposure control and exposure modulation based on body size.  Noncontrast head CT     COMPARISON: None available     FINDINGS:           No  acute intracranial hemorrhage, midline shift or mass effect. No acute  territorial infarct is identified.     Mild periventricular hypodensities suggest chronic small vessel ischemic  change in a patient this age.           Ventricles, cisterns, cerebral sulci are unremarkable for patient age.     The visualized paranasal sinuses, orbits, mastoid air cells are  unremarkable. Mild hyperostosis frontalis.                 IMPRESSION:     No acute intracranial hemorrhage or hydrocephalus. If there is further  clinical concern, MRI could be considered for further evaluation.     This report was finalized on 8/31/2019 3:59 PM by Dr. Chapin Davidson M.D.       Impression:  Patient is a 73-year-old female with PMH of chronic back pain, CAD status post MI, hypothyroidism, ported seizure disorder (on Zonegran 200 milligrams nightly prior to arrival) who was admitted 8/31 with hallucinations.  The patient had suffered a mechanical fall at home 7 days previous to admission and struck her head.  There is no loss of consciousness.  Since then the patient had been having progressive weakness in her legs and started hearing and seeing things that are not there.  She was seen by her PCP 8/28 was diagnosed with UTI and prescribed Bactrim although she had a unremarkable UA and normal urine culture.  She was found to have creatinine of 1.6 at PCP visit with a normal baseline creatinine.    Diagnosis: post concussive syndrome,toxic/metabolic encephalopathy (secondary to questionable UTI, CECILE, low TSH), CECILE-resolving, hallucinations, baseline seizure disorder.  CT head 8/31: No acute findings  Chest x-ray 8/31: No active disease  Labs: Creatinine 1.8 yesterday, down to 0.81 today.  TSH 0.035, free T4 1.70.  B12 279 in April 2018.  UA demonstrating budding yeast.    Plan:  Stop abilify. Give seroquel 50 mg now and 100 mg nightly.   PRN zyprexa for severe agitation  Check EEG tomorrow  Consider psychiatry consult  Synthroid adjusted by  primary team  Neurochecks  Patient d/w with Dr Mckeon today and these are her recommendations. Will follow.

## 2019-09-02 NOTE — PLAN OF CARE
Problem: Patient Care Overview  Goal: Plan of Care Review  Outcome: Ongoing (interventions implemented as appropriate)   09/02/19 0624   Coping/Psychosocial   Plan of Care Reviewed With patient   Plan of Care Review   Progress declining   OTHER   Outcome Summary VSS though having worsening confusion and hallucinations (Pt's Seroquel and Effexor was dc'd yesterday and Abilify started). Pt has had minimal if any sleep tonight and has been calling her  to come pick her up. As of now redirection and explination had been effective, however pt is extreemly restless. will CTM and provide care.

## 2019-09-02 NOTE — PROGRESS NOTES
" LOS: 2 days     Name: Lana Frankel  Age: 73 y.o.  Sex: female  :  1945  MRN: [id number]         Primary Care Physician: System, Provider Not In    Subjective   Subjective  Remains confused and hallucinating.  Sitting on the side of the bed upon entering the room she is very upset but cannot say exactly why.  Per her nurse she is not violent and does not appear to be a danger to herself or others at this time.    Objective   Vital Signs  Temp:  [97.3 °F (36.3 °C)-98.1 °F (36.7 °C)] 97.5 °F (36.4 °C)  Heart Rate:  [73-89] 78  Resp:  [16] 16  BP: (118-165)/(84-92) 165/89  Body mass index is 28.99 kg/m².    Objective:  General Appearance:  Comfortable and in no acute distress (Elderly and somewhat frail-appearing).    Vital signs: (most recent): Blood pressure 165/89, pulse 78, temperature 97.5 °F (36.4 °C), temperature source Oral, resp. rate 16, height 167.6 cm (66\"), weight 81.5 kg (179 lb 9.6 oz), SpO2 98 %.    Lungs:  Normal effort and normal respiratory rate.    Heart: Normal rate.  Regular rhythm.    Abdomen: Abdomen is soft.  Bowel sounds are normal.   There is no abdominal tenderness.     Extremities: There is no dependent edema or local swelling.    Neurological: (Confused and agitated but no violent behavior noted by me.).    Skin:  Warm and dry.              Results Review:       I reviewed the patient's new clinical results.    Results from last 7 days   Lab Units 19  0632 19  0541 19  1444 19  1318   WBC 10*3/mm3 4.00 4.93 6.12 5.27   HEMOGLOBIN g/dL 11.9* 12.5 14.2 14.0   PLATELETS 10*3/mm3 165 183 213 211     Results from last 7 days   Lab Units 19  0632 19  0541 19  2253 19  1444 19  1318   SODIUM mmol/L 142 134* 134* 134* 141   POTASSIUM mmol/L 4.2 4.3 4.2 5.1 4.2   CHLORIDE mmol/L 113* 110* 106 102 107   TOTAL CO2 mmol/L  --   --   --   --  18*   CO2 mmol/L 15.5* 13.5* 12.8* 13.9*  --    BUN mg/dL 20 39* 44* 48* 30*   CREATININE mg/dL 0.81 " 1.81* 2.25* 2.85* 1.6*   CALCIUM mg/dL 8.6 8.6 8.4* 9.2 9.9   GLUCOSE mg/dL 89 92 98 101*  --            Scheduled Meds:     aspirin 81 mg Oral Daily   atorvastatin 10 mg Oral Daily   carvedilol 3.125 mg Oral BID With Meals   levothyroxine 88 mcg Oral Q AM   potassium chloride 20 mEq Oral Daily   QUEtiapine 100 mg Oral Nightly   QUEtiapine 50 mg Oral Once   sodium chloride 10 mL Intravenous Q12H     PRN Meds:   •  acetaminophen **OR** acetaminophen **OR** acetaminophen  •  OLANZapine  •  ondansetron  •  sodium chloride  Continuous Infusions:       Assessment/Plan   Active Hospital Problems    Diagnosis  POA   • **CECILE (acute kidney injury) (CMS/HCC) [N17.9]  Yes   • Hallucinations [R44.3]  Yes   • Chronic back pain [M54.9, G89.29]  Yes   • Coronary artery disease [I25.10]  Yes   • Hypothyroidism (acquired) [E03.9]  Yes   • Seizures (CMS/HCC) [R56.9]  Yes   • Hyponatremia [E87.1]  Yes   • Metabolic acidosis [E87.2]  Yes   • Head injury, acute, sequela [S09.90XS]  Not Applicable   • Weakness [R53.1]  Yes   • Metabolic encephalopathy [G93.41]  Yes      Resolved Hospital Problems   No resolved problems to display.       Assessment & Plan    -Acute kidney injury has resolved.  I will stop IV fluids  -Hallucinations and confusion persist.  I do not see any other metabolic/electrolyte derangements or anything that can be corrected.  We will check B12, folate, RPR.  Head CT was negative.  -Appreciate input from neurology today.  Agree with discontinuing Abilify and starting back on Seroquel.  EEG will be ordered.  -Will ask psychiatry to evaluate her present condition as well  -Synthroid dose has been decreased upon admission and she will require repeat TSH in about 6 weeks  -Discussed with her  at bedside      Hank Comer MD  [facility]  09/02/19  1:17 PM

## 2019-09-03 LAB
ANION GAP SERPL CALCULATED.3IONS-SCNC: 15.4 MMOL/L (ref 5–15)
BUN BLD-MCNC: 15 MG/DL (ref 8–23)
BUN/CREAT SERPL: 21.1 (ref 7–25)
CALCIUM SPEC-SCNC: 9 MG/DL (ref 8.6–10.5)
CHLORIDE SERPL-SCNC: 106 MMOL/L (ref 98–107)
CO2 SERPL-SCNC: 13.6 MMOL/L (ref 22–29)
CREAT BLD-MCNC: 0.71 MG/DL (ref 0.57–1)
DEPRECATED RDW RBC AUTO: 46.9 FL (ref 37–54)
ERYTHROCYTE [DISTWIDTH] IN BLOOD BY AUTOMATED COUNT: 14.4 % (ref 12.3–15.4)
FOLATE SERPL-MCNC: 9.08 NG/ML (ref 4.78–24.2)
GFR SERPL CREATININE-BSD FRML MDRD: 81 ML/MIN/1.73
GLUCOSE BLD-MCNC: 90 MG/DL (ref 65–99)
HCT VFR BLD AUTO: 35.5 % (ref 34–46.6)
HGB BLD-MCNC: 11.6 G/DL (ref 12–15.9)
MCH RBC QN AUTO: 29.2 PG (ref 26.6–33)
MCHC RBC AUTO-ENTMCNC: 32.7 G/DL (ref 31.5–35.7)
MCV RBC AUTO: 89.4 FL (ref 79–97)
PLATELET # BLD AUTO: 175 10*3/MM3 (ref 140–450)
PMV BLD AUTO: 10.8 FL (ref 6–12)
POTASSIUM BLD-SCNC: 3.6 MMOL/L (ref 3.5–5.2)
RBC # BLD AUTO: 3.97 10*6/MM3 (ref 3.77–5.28)
RPR SER QL: NORMAL
SODIUM BLD-SCNC: 135 MMOL/L (ref 136–145)
VIT B12 BLD-MCNC: 208 PG/ML (ref 211–946)
WBC NRBC COR # BLD: 4.78 10*3/MM3 (ref 3.4–10.8)

## 2019-09-03 PROCEDURE — 85027 COMPLETE CBC AUTOMATED: CPT | Performed by: INTERNAL MEDICINE

## 2019-09-03 PROCEDURE — 80048 BASIC METABOLIC PNL TOTAL CA: CPT | Performed by: INTERNAL MEDICINE

## 2019-09-03 PROCEDURE — 99233 SBSQ HOSP IP/OBS HIGH 50: CPT | Performed by: NURSE PRACTITIONER

## 2019-09-03 PROCEDURE — 25010000002 CYANOCOBALAMIN PER 1000 MCG: Performed by: NURSE PRACTITIONER

## 2019-09-03 PROCEDURE — 82607 VITAMIN B-12: CPT | Performed by: INTERNAL MEDICINE

## 2019-09-03 PROCEDURE — 86592 SYPHILIS TEST NON-TREP QUAL: CPT | Performed by: INTERNAL MEDICINE

## 2019-09-03 PROCEDURE — 97110 THERAPEUTIC EXERCISES: CPT

## 2019-09-03 PROCEDURE — 97165 OT EVAL LOW COMPLEX 30 MIN: CPT

## 2019-09-03 PROCEDURE — 82746 ASSAY OF FOLIC ACID SERUM: CPT | Performed by: INTERNAL MEDICINE

## 2019-09-03 PROCEDURE — 97161 PT EVAL LOW COMPLEX 20 MIN: CPT

## 2019-09-03 PROCEDURE — 97535 SELF CARE MNGMENT TRAINING: CPT

## 2019-09-03 RX ORDER — CARVEDILOL 6.25 MG/1
6.25 TABLET ORAL 2 TIMES DAILY WITH MEALS
Status: DISCONTINUED | OUTPATIENT
Start: 2019-09-03 | End: 2019-09-04 | Stop reason: HOSPADM

## 2019-09-03 RX ORDER — CYANOCOBALAMIN 1000 UG/ML
1000 INJECTION, SOLUTION INTRAMUSCULAR; SUBCUTANEOUS DAILY
Status: DISCONTINUED | OUTPATIENT
Start: 2019-09-03 | End: 2019-09-04 | Stop reason: HOSPADM

## 2019-09-03 RX ADMIN — LEVOTHYROXINE SODIUM 88 MCG: 88 TABLET ORAL at 06:33

## 2019-09-03 RX ADMIN — QUETIAPINE FUMARATE 100 MG: 100 TABLET ORAL at 21:00

## 2019-09-03 RX ADMIN — SODIUM CHLORIDE, PRESERVATIVE FREE 10 ML: 5 INJECTION INTRAVENOUS at 08:18

## 2019-09-03 RX ADMIN — CARVEDILOL 3.12 MG: 3.12 TABLET, FILM COATED ORAL at 08:18

## 2019-09-03 RX ADMIN — CYANOCOBALAMIN 1000 MCG: 1000 INJECTION, SOLUTION INTRAMUSCULAR; SUBCUTANEOUS at 13:35

## 2019-09-03 RX ADMIN — POTASSIUM CHLORIDE 20 MEQ: 750 CAPSULE, EXTENDED RELEASE ORAL at 08:17

## 2019-09-03 RX ADMIN — OLANZAPINE 5 MG: 5 TABLET, FILM COATED ORAL at 23:20

## 2019-09-03 RX ADMIN — ATORVASTATIN CALCIUM 10 MG: 10 TABLET, FILM COATED ORAL at 08:18

## 2019-09-03 RX ADMIN — CARVEDILOL 6.25 MG: 6.25 TABLET, FILM COATED ORAL at 17:39

## 2019-09-03 RX ADMIN — ASPIRIN 81 MG: 81 TABLET, COATED ORAL at 08:18

## 2019-09-03 RX ADMIN — OLANZAPINE 5 MG: 5 TABLET, FILM COATED ORAL at 09:40

## 2019-09-03 RX ADMIN — SODIUM CHLORIDE, PRESERVATIVE FREE 10 ML: 5 INJECTION INTRAVENOUS at 21:00

## 2019-09-03 NOTE — PROGRESS NOTES
DOS: 9/3/2019  NAME: Lana Frankel   : 1945  PCP: System, Provider Not In    Chief Complaint   Patient presents with   • Hallucinations         Subjective: Pt seen in follow up, however the problem is new to me.  Remains with visual and auditory hallucinations.  She was able to tell me why she came to the hospital and what happened up until 3 weeks ago stating that she fell and hit her head and started to have hallucinations and altered thinking.  No family at bedside.    Objective:  Vital signs:   Vitals:    19 1340 19 1815 19 2330 19 0733   BP: (!) 143/109 (!) 154/110 (!) 180/103 (!) 172/103   BP Location: Left arm Left arm Left arm Left arm   Patient Position: Sitting Lying Lying Lying   Pulse: 98 92     Resp:    Temp: 97.9 °F (36.6 °C) 98.3 °F (36.8 °C) 97.6 °F (36.4 °C) 97.8 °F (36.6 °C)   TempSrc: Oral Oral Oral Oral   SpO2:   99%    Weight:       Height:           Current Facility-Administered Medications:   •  acetaminophen (TYLENOL) tablet 650 mg, 650 mg, Oral, Q4H PRN, 650 mg at 19 0835 **OR** acetaminophen (TYLENOL) 160 MG/5ML solution 650 mg, 650 mg, Oral, Q4H PRN **OR** acetaminophen (TYLENOL) suppository 650 mg, 650 mg, Rectal, Q4H PRN, Miles Narayan MD  •  aspirin EC tablet 81 mg, 81 mg, Oral, Daily, Miles Narayan MD, 81 mg at 19  •  atorvastatin (LIPITOR) tablet 10 mg, 10 mg, Oral, Daily, Miles Narayan MD, 10 mg at 19  •  carvedilol (COREG) tablet 3.125 mg, 3.125 mg, Oral, BID With Meals, Miles Narayan MD, 3.125 mg at 19  •  levothyroxine (SYNTHROID, LEVOTHROID) tablet 88 mcg, 88 mcg, Oral, Q AM, Hank Comer MD, 88 mcg at 19 0633  •  OLANZapine (zyPREXA) tablet 5 mg, 5 mg, Oral, Q12H PRN, Mirna Robison APRN, 5 mg at 19 0940  •  ondansetron (ZOFRAN) injection 4 mg, 4 mg, Intravenous, Q6H PRN, Miles Narayan MD, 4 mg at 19 1108  •  potassium chloride (MICRO-K) CR capsule 20  mEq, 20 mEq, Oral, Daily, Miles Narayan MD, 20 mEq at 09/03/19 0817  •  QUEtiapine (SEROquel) tablet 100 mg, 100 mg, Oral, Nightly, Mirna Robison APRN, 100 mg at 09/02/19 2030  •  sodium chloride 0.9 % flush 10 mL, 10 mL, Intravenous, Q12H, Milse Narayan MD, 10 mL at 09/03/19 0818  •  sodium chloride 0.9 % flush 10 mL, 10 mL, Intravenous, PRN, Miles Narayan MD    PRN meds  •  acetaminophen **OR** acetaminophen **OR** acetaminophen  •  OLANZapine  •  ondansetron  •  sodium chloride    No current facility-administered medications on file prior to encounter.      Current Outpatient Medications on File Prior to Encounter   Medication Sig   • albuterol sulfate  (90 Base) MCG/ACT inhaler Inhale 2 puffs Every 6 (Six) Hours As Needed for Wheezing.   • aspirin 81 MG EC tablet Take 81 mg by mouth Daily.   • atorvastatin (LIPITOR) 10 MG tablet Take 10 mg by mouth Daily.   • carvedilol (COREG) 3.125 MG tablet Take 3.125 mg by mouth 2 (Two) Times a Day With Meals.   • ergocalciferol (DRISDOL) 8000 UNIT/ML drops Take  by mouth Daily.   • levothyroxine (SYNTHROID, LEVOTHROID) 100 MCG tablet Take 100 mcg by mouth Daily.   • montelukast (SINGULAIR) 10 MG tablet Take 10 mg by mouth Every Night.   • nitroglycerin (NITROSTAT) 0.4 MG SL tablet Place 0.4 mg under the tongue Every 5 (Five) Minutes As Needed for Chest Pain. Take no more than 3 doses in 15 minutes.   • pantoprazole (PROTONIX) 40 MG EC tablet Take 40 mg by mouth Daily.   • potassium chloride (K-DUR,KLOR-CON) 10 MEQ ER tablet Take 20 mEq by mouth Daily.   • pramipexole (MIRAPEX) 0.125 MG tablet Take 0.125 mg by mouth 2 (Two) Times a Day.   • QUEtiapine (SEROquel) 25 MG tablet Take 25 mg by mouth Every Night.   • sulfamethoxazole-trimethoprim (BACTRIM DS,SEPTRA DS) 800-160 MG per tablet Take 1 tablet by mouth 2 (Two) Times a Day.   • VENLAFAXINE HCL ER PO Take 50 mg by mouth Daily.   • vitamin D (ERGOCALCIFEROL) 08504 units capsule capsule Take 50,000  Units by mouth 1 (One) Time Per Week.   • zonisamide (ZONEGRAN) 100 MG capsule Take 200 mg by mouth Every Night.       General appearance: NAD, alert and cooperative, pleasant  HEENT: Normocephalic, atraumatic, PERRL, no masses or tenderness  COR: RRR  Resp: Even and unlabored  Extremities: Equal pulses  Skin: warm, dry    Neurological:   MS: oriented x4, was having a conversation to herself in the room, recent/remote memory intact, normal attention/concentration, language intact, no neglect, normal fund of knowledge  CN: visual acuity grossly normal, visual fields full, PERRL, EOMI, facial sensation equal, no facial droop, hearing symmetric, palate elevates symmetrically, shoulder shrug equal, tongue midline  Motor: 5/5 in all 4 ext., normal tone  Reflexes: 1+ in all 4 ext.  Sensory: light touch sensation intact in all 4 ext.  Coordination: Normal finger to nose test  Gait and station: no ataxia  Rapid alternating movements: normal finger to thumb tap    Laboratory results:  Lab Results   Component Value Date    TSH 0.035 (L) 08/31/2019     Lab Results   Component Value Date    QFGDREMN00 208 (L) 09/03/2019     Lab Results   Component Value Date    HGBA1C 5.4 12/05/2018     Lab Results   Component Value Date    GLUCOSE 90 09/03/2019    BUN 15 09/03/2019    CREATININE 0.71 09/03/2019    EGFRIFNONA 81 09/03/2019    BCR 21.1 09/03/2019    K 3.6 09/03/2019    CO2 13.6 (L) 09/03/2019    CALCIUM 9.0 09/03/2019    ALBUMIN 4.30 09/01/2019    LABIL2 1.3 08/28/2019    AST 12 09/01/2019    ALT <5 09/01/2019     Lab Results   Component Value Date    WBC 4.78 09/03/2019    HGB 11.6 (L) 09/03/2019    HCT 35.5 09/03/2019    MCV 89.4 09/03/2019     09/03/2019     Brief Urine Lab Results  (Last result in the past 365 days)      Color   Clarity   Blood   Leuk Est   Nitrite   Protein   CREAT   Urine HCG        08/31/19 1548 Yellow Clear Negative Negative Negative 30 mg/dL (1+)               Pain Management Panel     Pain  Management Panel Latest Ref Rng & Units 4/2/2019 10/15/2018    AMPHETAMINES SCREEN, URINE Negative (arb'U) Negative Negative    BARBITURATES SCREEN Negative ng/mL Negative Negative    BENZODIAZEPINE SCREEN, URINE Negative ng/mL Negative Negative    COCAINE SCREEN, URINE Negative Negative Negative          Review and interpretation of imaging:  Ct Head Without Contrast    Result Date: 8/31/2019   No acute intracranial hemorrhage or hydrocephalus. If there is further clinical concern, MRI could be considered for further evaluation.  This report was finalized on 8/31/2019 3:59 PM by Dr. Chapin Davidson M.D.      Xr Chest 1 View    Result Date: 8/31/2019  No evidence for active disease in the chest  This report was finalized on 8/31/2019 5:25 PM by Dr. Mak Dior M.D.      Impression/Assessment:  This is a 73-year-old female with past medical history of CAD, hypothyroidism, seizure disorder on Zonegran 200 mg prior to admission, chronic back pain who presented to the hospital 8/31/2019 with complaints of hallucinations.  She reportedly suffered a fall at home prior to admission where she hit her head but reportedly had no loss of consciousness.  Since the fall she has been having reported progressive weakness in bilateral legs with visual and auditory hallucinations.  She was seen by her PCP on 8/28/2019 and was diagnosed with a UTI and started on an antibiotic for treatment as well as an CECILE.  No tox screen was done on admission.    1.  Postconcussive syndrome  2.  Metabolic encephalopathy, improved  3.  Visual and auditory hallucinations  4.  Seizure disorder  5.  Acute kidney injury  6.  Hypothyroidism    Work-up to date:  CT head 8/31: No acute findings  Chest x-ray 8/31: No active disease  Labs: Cr 2.85 on admission,TSH 0.035, free T4 1.70.  B12 208, LA 1.5, Mg 1.9, Folate 9.08  U/A:  Trace bacteria, yeast present, negative leuks    I reviewed her records from [facility] and it appears that she has been seen  on multiple occasions for episodic confusion she was recently seen at [place] on 6/25/2019 by DANISH Ansari for episodic confusion and metabolic encephalopathy.  Per her note she reports of the patient's last seizure was on 6/19/2019 and she underwent extensive EEG monitoring without capture of an event. Last EEG on 4/2/2019 revealed mild-moderate encephalopathy of unknown etiology.  She was admitted and patient evaluated with no known cause determined.  She was also noted to have severe hypothyroidism even though labs 2 months prior revealed hyperthyroidism.  She also underwent a neuropsych test on 4/17/2019 but there were no notes available at that time.  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  The patient was switched from Topamax to zonisamide to try and lessen cognitive complaints.  She is also had multiple previous MRIs of the brain with no significant intracranial abnormalities.    She was oriented x4 and followed all commands appropriately, however she is still having persistent visual and auditory hallucinations, despite Seroquel dose adjustment and PRN Zyprexa.  Her B12 was 208, I will order cyanocobalamin 1000mcgs IM x5 doses.  She has also been started on Synthroid, per primary.  Creatinine stabilized today at 0.71.  Psych has evaluated her and recommends the patient be transferred to [facility]. I will discuss with Dr. Mckeon today, on continued need for EEG and if appropriate to transfer to [facility]. Other plans as stated below. Therapies as written. Call RRT for any acute neurological changes. We will continue to follow and advise.    Plan:  Vitamin B12 1000mcg IM x 5 doses, further replacement per primary  EEG pending  RPR pending   Continue Seroquel 100mg nightly and PRN Zyprexa for agitation, last QT interval 381 on 8/31.  Neurochecks q4  Normalize BP  PT/OT/ST  Will follow.    Addendum: Discussed  patient with Dr. Mckeon, she is okay with the patient moving to [facility] today once her hypertension is corrected.  No need for EEG, I will cancel the order.  Highly recommend that the patient is not to be placed back on pramipexole. We will sign off, will see again per request.    Case discussed with RN, patient, Maritza Holden, and Dr. Mckeon, and she agrees with plan above.   DANISH Ayers

## 2019-09-03 NOTE — PROGRESS NOTES
Discharge Planning Assessment  [facility]     Patient Name: Lana Frankel  MRN: [id number]  Today's Date: 9/3/2019    Admit Date: 8/31/2019    Discharge Needs Assessment     Row Name 09/03/19 1341       Living Environment    Lives With  spouse    Name(s) of Who Lives With Patient  Wilian Frankel, [phone]    Current Living Arrangements  home/apartment/condo    Primary Care Provided by  self    Provides Primary Care For  no one    Family Caregiver if Needed  spouse    Quality of Family Relationships  helpful;supportive;involved       Resource/Environmental Concerns    Resource/Environmental Concerns  none       Transition Planning    Patient/Family Anticipates Transition to  home with family    Patient/Family Anticipated Services at Transition  none    Transportation Anticipated  family or friend will provide       Discharge Needs Assessment    Concerns to be Addressed  discharge planning    Equipment Currently Used at Home  cane, straight;walker, rolling    Discharge Coordination/Progress  Pending treatment course        Discharge Plan     Row Name 09/03/19 1342       Plan    Plan  Pending treatment course    Patient/Family in Agreement with Plan  yes    Plan Comments  IMM letter checked. CCP met with pt and family to discuss d/c planning. Facesheet verified. CCP role explained. Pt resides with her  and uses cane or walker for mobility. Pt has no h/o home health or sub-acute rehab. Pt's pharmacy is [facility]. Pt's needs unknown pending treatment course. Psychiatry following for possible [facility] admission if psychosis does not resolve. CCP to follow. Nayeli Ingram LCSW        Destination      No service coordination in this encounter.      Durable Medical Equipment      No service coordination in this encounter.      Dialysis/Infusion      No service coordination in this encounter.      Home Medical Care      No service coordination in this encounter.      Therapy      No service coordination in  this encounter.      Community Resources      No service coordination in this encounter.          Demographic Summary     Row Name 09/03/19 1336       General Information    Admission Type  inpatient    Arrived From  home    Required Notices Provided  Important Message from Medicare    Referral Source  admission list    Reason for Consult  discharge planning    Preferred Language  English        Functional Status     Row Name 09/03/19 1336       Functional Status    Usual Activity Tolerance  moderate    Current Activity Tolerance  fair       Functional Status, IADL    Medications  assistive equipment and person    Meal Preparation  assistive equipment and person    Housekeeping  assistive equipment and person    Laundry  assistive equipment and person    Shopping  assistive equipment and person       Mental Status Summary    Recent Changes in Mental Status/Cognitive Functioning  mental status;thought patterns        Psychosocial    No documentation.       Abuse/Neglect    No documentation.       Legal    No documentation.       Substance Abuse    No documentation.       Patient Forms    No documentation.           Tayla Ingram LCSW

## 2019-09-03 NOTE — PROGRESS NOTES
"Access Ctr Note.    Recent chart entries reviewed.    Pt seen by psychiatrist this am. She is still exhibiting paranoid sxs.   Pt stated that she's going through a divorce and her \"sister is picking me up tomorrow to take me back to the house to pack and get my really good purses: Ginna Burgess, you know expensive ones. I don't want to leave those there.\"    Pt reports no depression & anxiety \"a 30\", which was down from \"50\" she told this writer two days ago.     Spoke with [facility] who reported that Neurology dept has medically cleared Pt.    Access following.  "

## 2019-09-03 NOTE — THERAPY EVALUATION
"Patient Name: Lana Frankel  : 1945    MRN: [id number]                              Today's Date: 9/3/2019       Admit Date: 2019    Visit Dx:     ICD-10-CM ICD-9-CM   1. Hallucinations R44.3 780.1   2. Acute renal failure, unspecified acute renal failure type (CMS/HCC) N17.9 584.9   3. Metabolic acidosis E87.2 276.2     Patient Active Problem List   Diagnosis   • Hallucinations   • Chronic back pain   • Coronary artery disease   • Hypothyroidism (acquired)   • Seizures (CMS/HCC)   • CECILE (acute kidney injury) (CMS/HCC)   • Hyponatremia   • Metabolic acidosis   • Head injury, acute, sequela   • Weakness   • Metabolic encephalopathy     Past Medical History:   Diagnosis Date   • Chronic back pain    • Coronary artery disease    • Disease of thyroid gland    • Myocardial infarction (CMS/HCC)    • Seizures (CMS/HCC)      Past Surgical History:   Procedure Laterality Date   • CHOLECYSTECTOMY     • HYSTERECTOMY     • NASAL SEPTUM SURGERY     • REPLACEMENT TOTAL KNEE     • THYROID SURGERY       General Information     Row Name 19          PT Evaluation Time/Intention    Document Type  evaluation Pt. admitted with a UTI; Acute Kidney Injury; Auditory/Visual Hallucinations;  Pt. reports feeling \"okay\" this AM with c/o fatigue, weakness overall.   -MS     Mode of Treatment  individual therapy;physical therapy  -MS     Row Name 19          General Information    Patient Profile Reviewed?  yes  -MS     Prior Level of Function  independent: With use of Straight Cane  -MS     Existing Precautions/Restrictions  fall  (Significant)  Exit alarm  -MS     Barriers to Rehab  none identified  -MS     Row Name 19          Cognitive Assessment/Intervention- PT/OT    Orientation Status (Cognition)  oriented to;person;place  -MS     Row Name 19          Safety Issues, Functional Mobility    Comment, Safety Issues/Impairments (Mobility)  Gait belt used for safety.  -MS       User Key  " (r) = Recorded By, (t) = Taken By, (c) = Cosigned By    Initials Name Provider Type    MS Mckeon Jose GARZA, PT Physical Therapist        Mobility     Row Name 09/03/19 0920          Bed Mobility Assessment/Treatment    Bed Mobility Assessment/Treatment  supine-sit;sit-supine  -MS     Supine-Sit [place] (Bed Mobility)  contact guard  -MS     Sit-Supine [place] (Bed Mobility)  contact guard  -MS     Assistive Device (Bed Mobility)  bed rails  -MS     Comment (Bed Mobility)  Once sitting EOB, pt. is Independent with dynamic sitting balance.  -MS     Row Name 09/03/19 0920          Sit-Stand Transfer    Sit-Stand [place] (Transfers)  contact guard  -MS     Assistive Device (Sit-Stand Transfers)  walker, front-wheeled  -MS     Row Name 09/03/19 0920          Gait/Stairs Assessment/Training    [place] Level (Gait)  contact guard  -MS     Assistive Device (Gait)  walker, front-wheeled  -MS     Distance in Feet (Gait)  120 feet  -MS     Pattern (Gait)  step-through  -MS     Bilateral Gait Deviations  forward flexed posture  -MS     Comment (Gait/Stairs)  Verbal/tactile cues for posture correction and Rwx guidance.  Pt. limited in gait distance by fatigue this AM.  -MS       User Key  (r) = Recorded By, (t) = Taken By, (c) = Cosigned By    Initials Name Provider Type    MS Mckeon Jose GARZA PT Physical Therapist        Obj/Interventions     Row Name 09/03/19 0922          General ROM    GENERAL ROM COMMENTS  BUE/LE (WFL's)  -MS     Row Name 09/03/19 0922          MMT (Manual Muscle Testing)    General MMT Comments  BUE/LE (3+/5)  -MS       User Key  (r) = Recorded By, (t) = Taken By, (c) = Cosigned By    Initials Name Provider Type    MS Mckeon Jose GARZA, PT Physical Therapist        Goals/Plan     Row Name 09/03/19 0923          Bed Mobility Goal 1 (PT)    Activity/Assistive Device (Bed Mobility Goal 1, PT)  bed mobility activities, all  -MS     [place] Level/Cues Needed (Bed Mobility Goal 1, PT)   independent  -MS     Time Frame (Bed Mobility Goal 1, PT)  long term goal (LTG);1 week  -MS     Row Name 09/03/19 0923          Transfer Goal 1 (PT)    Activity/Assistive Device (Transfer Goal 1, PT)  transfers, all With AAD  -MS     [place] Level/Cues Needed (Transfer Goal 1, PT)  independent  -MS     Time Frame (Transfer Goal 1, PT)  long term goal (LTG);1 week  -MS     Row Name 09/03/19 0923          Gait Training Goal 1 (PT)    Activity/Assistive Device (Gait Training Goal 1, PT)  gait (walking locomotion) With AAD  -MS     [place] Level (Gait Training Goal 1, PT)  independent  -MS     Distance (Gait Goal 1, PT)  300 feet  -MS     Time Frame (Gait Training Goal 1, PT)  long term goal (LTG);1 week  -MS       User Key  (r) = Recorded By, (t) = Taken By, (c) = Cosigned By    Initials Name Provider Type    Jose Denson L, PT Physical Therapist        Clinical Impression     Row Name 09/03/19 0922          Pain Assessment    Additional Documentation  Pain Scale: Numbers Pre/Post-Treatment (Group)  -MS     [facility] Name 09/03/19 0922          Pain Scale: Numbers Pre/Post-Treatment    Pain Scale: Numbers, Pretreatment  0/10 - no pain  -MS     Pain Scale: Numbers, Post-Treatment  0/10 - no pain  -MS     Row Name 09/03/19 0922          Plan of Care Review    Plan of Care Reviewed With  patient  -MS     [facility] Name 09/03/19 0922          Physical Therapy Clinical Impression    Criteria for Skilled Interventions Met (PT Clinical Impression)  yes  -MS     Rehab Potential (PT Clinical Summary)  good, to achieve stated therapy goals  -MS     Row Name 09/03/19 0922          Positioning and Restraints    Pre-Treatment Position  in bed  -MS     Post Treatment Position  bed  -MS     In Bed  notified nsg;supine;call light within reach;encouraged to call for assist;exit alarm on All lines intact.  -MS       User Key  (r) = Recorded By, (t) = Taken By, (c) = Cosigned By    Initials Name Provider Type    Jose Denson  GREG, PT Physical Therapist        Outcome Measures     Row Name 09/03/19 0927          How much help from another person do you currently need...    Turning from your back to your side while in flat bed without using bedrails?  3  -MS     Moving from lying on back to sitting on the side of a flat bed without bedrails?  3  -MS     Moving to and from a bed to a chair (including a wheelchair)?  3  -MS     Standing up from a chair using your arms (e.g., wheelchair, bedside chair)?  3  -MS     Climbing 3-5 steps with a railing?  2  -MS     To walk in hospital room?  3  -MS     AM-PAC 6 Clicks Score (PT)  17  -MS     Row Name 09/03/19 0927          Functional Assessment    Outcome Measure Options  AM-PAC 6 Clicks Basic Mobility (PT)  -MS       User Key  (r) = Recorded By, (t) = Taken By, (c) = Cosigned By    Initials Name Provider Type    MS Jose Mckeon, PT Physical Therapist        Physical Therapy Education     Title: PT OT SLP Therapies (Done)     Topic: Physical Therapy (Done)     Point: Mobility training (Done)     Learning Progress Summary           Patient Acceptance, E,D, VU,NR by MS at 9/3/2019  9:24 AM                   Point: Home exercise program (Done)     Learning Progress Summary           Patient Acceptance, E,D, VU,NR by MS at 9/3/2019  9:24 AM                   Point: Body mechanics (Done)     Learning Progress Summary           Patient Acceptance, E,D, VU,NR by MS at 9/3/2019  9:24 AM                   Point: Precautions (Done)     Learning Progress Summary           Patient Acceptance, E,D, VU,NR by MS at 9/3/2019  9:24 AM                               User Key     Initials Effective Dates Name Provider Type Discipline    MS 04/03/18 -  Jose Mckeon, PT Physical Therapist PT              PT Recommendation and Plan  Planned Therapy Interventions (PT Eval): balance training, bed mobility training, gait training, home exercise program, patient/family education, postural re-education,  strengthening, transfer training  Outcome Summary/Treatment Plan (PT)  Anticipated Discharge Disposition (PT): home with home health, home with assist  Plan of Care Reviewed With: patient  Outcome Summary: Pt. presents to hospital with h/o recent fall at home and c/o auditory/visual hallucinations.  Pt. diagnosed with UTI and Acute kidney injury.  Pt. exhibits overall deficits in strength (3+/5 x 4 extremities) and balance affecting her general functional mobility.  Pt. will benefit from skilled inpt. P.T. to address her functional deficits and to assist pt. in regaining her maximum level of independence with functional mobility.      Time Calculation:   PT Charges     Row Name 09/03/19 0927             Time Calculation    Start Time  0835  -MS      Stop Time  0850  -MS      Time Calculation (min)  15 min  -MS      PT Received On  09/03/19  -MS      PT - Next Appointment  09/04/19  -MS      PT Goal Re-Cert Due Date  09/10/19  -MS         Time Calculation- PT    Total Timed Code Minutes- PT  13 minute(s)  -MS        User Key  (r) = Recorded By, (t) = Taken By, (c) = Cosigned By    Initials Name Provider Type    Jose Denson, PT Physical Therapist        Therapy Charges for Today     Code Description Service Date Service Provider Modifiers Qty    60667437093 HC PT EVAL LOW COMPLEXITY 1 9/3/2019 Jsoe Mckeon, PT GP 1    61246168545 HC PT THER PROC EA 15 MIN 9/3/2019 Jose Mckeon, PT GP 1          PT G-Codes  Outcome Measure Options: AM-PAC 6 Clicks Basic Mobility (PT)  AM-PAC 6 Clicks Score (PT): 17    Jose Mckeon, JORDYN  9/3/2019
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
## 2019-09-03 NOTE — PLAN OF CARE
Problem: Patient Care Overview  Goal: Plan of Care Review   09/03/19 0983   Coping/Psychosocial   Plan of Care Reviewed With patient   OTHER   Outcome Summary Pt. presents to hospital with h/o recent fall at home and c/o auditory/visual hallucinations. Pt. diagnosed with UTI and Acute kidney injury. Pt. exhibits overall deficits in strength (3+/5 x 4 extremities) and balance affecting her general functional mobility. Pt. will benefit from skilled inpt. P.T. to address her functional deficits and to assist pt. in regaining her maximum level of independence with functional mobility.

## 2019-09-03 NOTE — THERAPY EVALUATION
Acute Care - Occupational Therapy Initial Evaluation  [facility]     Patient Name: Lana Frankel  : 1945  MRN: [id number]  Today's Date: 9/3/2019  Onset of Illness/Injury or Date of Surgery: 19  Date of Referral to OT: 19  Referring Physician: Dr Comer    Admit Date: 2019       ICD-10-CM ICD-9-CM   1. Hallucinations R44.3 780.1   2. Acute renal failure, unspecified acute renal failure type (CMS/HCC) N17.9 584.9   3. Metabolic acidosis E87.2 276.2     Patient Active Problem List   Diagnosis   • Hallucinations   • Chronic back pain   • Coronary artery disease   • Hypothyroidism (acquired)   • Seizures (CMS/HCC)   • CECILE (acute kidney injury) (CMS/HCC)   • Hyponatremia   • Metabolic acidosis   • Head injury, acute, sequela   • Weakness   • Metabolic encephalopathy     Past Medical History:   Diagnosis Date   • Chronic back pain    • Coronary artery disease    • Disease of thyroid gland    • Myocardial infarction (CMS/HCC)    • Seizures (CMS/HCC)      Past Surgical History:   Procedure Laterality Date   • CHOLECYSTECTOMY     • HYSTERECTOMY     • NASAL SEPTUM SURGERY     • REPLACEMENT TOTAL KNEE     • THYROID SURGERY            OT ASSESSMENT FLOWSHEET (last 12 hours)      Occupational Therapy Evaluation     Row Name 19 0906                   OT Evaluation Time/Intention    Subjective Information  no complaints  -SK        Document Type  evaluation  -SK        Mode of Treatment  individual therapy;occupational therapy  -SK        Patient Effort  good  -SK           General Information    Patient Profile Reviewed?  yes  -SK        Onset of Illness/Injury or Date of Surgery  19  -SK        Referring Physician  Dr Comer  -SK        Patient Observations  alert;cooperative;agree to therapy  -SK        Prior Level of Function  independent:  -SK        Equipment Currently Used at Home  cane, straight  -SK        Existing Precautions/Restrictions  fall  -SK        Barriers to Rehab   none identified  -SK           Cognitive Assessment/Intervention- PT/OT    Orientation Status (Cognition)  oriented to;person;place  -SK        Follows Commands (Cognition)  follows one step commands;over 90% accuracy  -SK        Safety Deficit (Cognitive)  mild deficit  -SK           Safety Issues, Functional Mobility    Safety Issues Affecting Function (Mobility)  awareness of need for assistance;safety precaution awareness;safety precautions follow-through/compliance  -SK           Bed Mobility Assessment/Treatment    Bed Mobility Assessment/Treatment  supine-sit;sit-supine  -SK        Supine-Sit [place] (Bed Mobility)  contact guard  -SK        Sit-Supine [place] (Bed Mobility)  contact guard  -SK        Assistive Device (Bed Mobility)  bed rails  -SK           Functional Mobility    Functional Mobility- Ind. Level  contact guard assist;minimum assist (75% patient effort)  -SK        Functional Mobility- Device  rolling walker  -SK        Functional Mobility-Distance (Feet)  20  -SK        Functional Mobility- Safety Issues  balance decreased during turns;step length decreased  -SK        Functional Mobility- Comment  LOB noted 3 times with ambulation to restroom, CGA-Min A to regain   -SK           Transfer Assessment/Treatment    Transfer Assessment/Treatment  sit-stand transfer;stand-sit transfer;toilet transfer  -SK           Sit-Stand Transfer    Sit-Stand [place] (Transfers)  contact guard  -SK        Assistive Device (Sit-Stand Transfers)  walker, front-wheeled  -SK           Stand-Sit Transfer    Stand-Sit [place] (Transfers)  contact guard  -SK        Assistive Device (Stand-Sit Transfers)  walker, front-wheeled  -SK           Toilet Transfer    Type (Toilet Transfer)  sit-stand;stand-sit  -SK        [place] Level (Toilet Transfer)  minimum assist (75% patient effort);1 person to manage equipment  -SK        Assistive Device (Toilet Transfer)  commode;grab bars/safety  frame;walker, front-wheeled  -SK           ADL Assessment/Intervention    BADL Assessment/Intervention  upper body dressing;lower body dressing;bathing;grooming;toileting  -SK           Lower Body Dressing Assessment/Training    Lower Body Dressing [place] Level  doff;don;socks;set up  -SK        Lower Body Dressing Position  edge of bed sitting  -SK           Grooming Assessment/Training    [place] Level (Grooming)  wash face, hands;set up;contact guard assist  -SK        Grooming Position  sink side  -SK        Comment (Grooming)  Assist secondary to decreased balance in standing   -SK           Toileting Assessment/Training    [place] Level (Toileting)  adjust/manage clothing;perform perineal hygiene;minimum assist (75% patient effort);contact guard assist  -SK        Assistive Devices (Toileting)  commode, bedside with drop arms;grab bar/safety frame  -SK        Toileting Position  supported standing;supported sitting  -SK           General ROM    GENERAL ROM COMMENTS  BUE WFL   -SK           MMT (Manual Muscle Testing)    General MMT Comments  Grossly 3+/5  -SK           Motor Assessment/Interventions    Additional Documentation  Balance Interventions (Group);Balance (Group);Therapeutic Exercise (Group);Therapeutic Exercise Interventions (Group)  -SK           Balance    Balance  static sitting balance  -SK           Static Sitting Balance    Level of [place] (Unsupported Sitting, Static Balance)  conditional independence  -SK        Sitting Position (Unsupported Sitting, Static Balance)  sitting on edge of bed  -SK        Time Able to Maintain Position (Unsupported Sitting, Static Balance)  4 to 5 minutes  -SK           Sensory Assessment/Intervention    Sensory General Assessment  no sensation deficits identified  -SK           Positioning and Restraints    Pre-Treatment Position  in bed  -SK        Post Treatment Position  bed  -SK        In Bed  supine;fowlers;exit alarm on;encouraged to  call for assist;call light within reach  -SK           Pain Scale: Numbers Pre/Post-Treatment    Pain Scale: Numbers, Pretreatment  0/10 - no pain  -SK        Pain Scale: Numbers, Post-Treatment  0/10 - no pain  -SK           Plan of Care Review    Plan of Care Reviewed With  patient  -SK           Clinical Impression (OT)    Date of Referral to OT  09/01/19  -SK        OT Diagnosis  Pt. 72 YO female presents to hospital with h/o recent fall at home and c/o auditory/visual hallucinations. Pt. diagnosed with UTI and Acute kidney injury.  pt presents to OT with generalized weakness, decreased endurance, some confusion with activity resulting in decreased ind with ADLs.  -SK        Functional Level at Time of Evaluation (OT Eval)    Pt perform bed mobility with CGA, CGA sit to stand, transfer to toilet and perform toileting with CGA/Min A secondary to increased unsteadiness noted, grooming upright at sink with set-up/CGA.    -SK        Criteria for Skilled Therapeutic Interventions Met (OT Eval)  yes  -SK        Rehab Potential (OT Eval)  good, to achieve stated therapy goals  -SK        Therapy Frequency (OT Eval)  5 times/wk  -SK        Anticipated Discharge Disposition (OT)  home with assist;skilled nursing facility  -SK           Vital Signs    Intra Systolic BP Rehab  172  -SK        Intra Treatment Diastolic BP  105  -SK        Intratreatment Heart Rate (beats/min)  93  -SK           Planned OT Interventions    Planned Therapy Interventions (OT Eval)  activity tolerance training;BADL retraining;strengthening exercise;transfer/mobility retraining  -SK           OT Goals    Transfer Goal Selection (OT)  transfer, OT goal 1  -SK        Bathing Goal Selection (OT)  bathing, OT goal 1  -SK        Toileting Goal Selection (OT)  toileting, OT goal 1  -SK        Strength Goal Selection (OT)  strength, OT goal 1  -SK        Additional Documentation  Strength Goal Selection (OT) (Row)  -SK           Transfer Goal 1 (OT)     Activity/Assistive Device (Transfer Goal 1, OT)  transfers, all;sit-to-stand/stand-to-sit;bed-to-chair/chair-to-bed;toilet  -SK        [place] Level/Cues Needed (Transfer Goal 1, OT)  conditional independence  -SK        Time Frame (Transfer Goal 1, OT)  1 week  -SK        Progress/Outcome (Transfer Goal 1, OT)  goal ongoing  -SK           Bathing Goal 1 (OT)    Activity/Assistive Device (Bathing Goal 1, OT)  shower chair;bathing skills, all;upper body bathing;lower body bathing  -SK        [place] Level/Cues Needed (Bathing Goal 1, OT)  conditional independence  -SK        Time Frame (Bathing Goal 1, OT)  1 week  -SK        Progress/Outcomes (Bathing Goal 1, OT)  goal ongoing  -SK           Toileting Goal 1 (OT)    Activity/Device (Toileting Goal 1, OT)  toileting skills, all;adjust/manage clothing;commode;grab bar/safety frame  -SK        [place] Level/Cues Needed (Toileting Goal 1, OT)  conditional independence  -SK        Time Frame (Toileting Goal 1, OT)  1 week  -SK        Progress/Outcome (Toileting Goal 1, OT)  goal ongoing  -SK           Strength Goal 1 (OT)    Strength Goal 1 (OT)  pt perform BUE AROM to increase strength to 4/5 to assist with ADLs.    -SK        Time Frame (Strength Goal 1, OT)  1 week  -SK        Progress/Outcome (Strength Goal 1, OT)  goal ongoing  -SK          User Key  (r) = Recorded By, (t) = Taken By, (c) = Cosigned By    Initials Name Effective Dates    Gloria Duncan, OT 02/25/19 -          Occupational Therapy Education     Title: PT OT SLP Therapies (Done)     Topic: Occupational Therapy (Done)     Point: ADL training (Done)     Description: Instruct learner(s) on proper safety adaptation and remediation techniques during self care or transfers.   Instruct in proper use of assistive devices.    Learning Progress Summary           Patient TLAIA Monzon VU by SK at 9/3/2019 12:58 PM    Comment:  educate pt on safety with ADLs, DME for bathroom safety, UE ex                    Point: Home exercise program (Done)     Description: Instruct learner(s) on appropriate technique for monitoring, assisting and/or progressing therapeutic exercises/activities.    Learning Progress Summary           Patient Acceptance, E, VU by SK at 9/3/2019 12:58 PM    Comment:  educate pt on safety with ADLs, DME for bathroom safety, UE ex                   Point: Precautions (Done)     Description: Instruct learner(s) on prescribed precautions during self-care and functional transfers.    Learning Progress Summary           Patient Acceptance, E, VU by SK at 9/3/2019 12:58 PM    Comment:  educate pt on safety with ADLs, DME for bathroom safety, UE ex                   Point: Body mechanics (Done)     Description: Instruct learner(s) on proper positioning and spine alignment during self-care, functional mobility activities and/or exercises.    Learning Progress Summary           Patient Acceptance, E, VU by SK at 9/3/2019 12:58 PM    Comment:  educate pt on safety with ADLs, DME for bathroom safety, UE ex                               User Key     Initials Effective Dates Name Provider Type Discipline    SK 02/25/19 -  Gloria Lucas OT Occupational Therapist OT                  OT Recommendation and Plan  Outcome Summary/Treatment Plan (OT)  Anticipated Discharge Disposition (OT): home with assist  Planned Therapy Interventions (OT Eval): activity tolerance training, BADL retraining, strengthening exercise, transfer/mobility retraining  Therapy Frequency (OT Eval): 5 times/wk  Plan of Care Review  Plan of Care Reviewed With: patient  Plan of Care Reviewed With: patient  Outcome Summary: Pt. 74 YO female presents to hospital with h/o recent fall at home and c/o auditory/visual hallucinations. Pt. diagnosed with UTI and Acute kidney injury.  pt presents to OT with generalized weakness, decreased endurance, some confusion with activity resulting in decreased ind with ADLs.  Pt perform bed mobility  with CGA, CGA sit to stand, transfer to toilet and perform toileting with CGA/Min A secondary to increased unsteadiness noted, grooming upright at sink with set-up/CGA.  Pt will benefit from skilled OT ot address deficits and inc jefe with ADLs.     Outcome Measures     Row Name 09/03/19 1200             How much help from another is currently needed...    Putting on and taking off regular lower body clothing?  3  -SK      Bathing (including washing, rinsing, and drying)  3  -SK      Toileting (which includes using toilet bed pan or urinal)  3  -SK      Putting on and taking off regular upper body clothing  3  -SK      Taking care of personal grooming (such as brushing teeth)  3  -SK      Eating meals  4  -SK      AM-PAC 6 Clicks Score (OT)  19  -SK         Functional Assessment    Outcome Measure Options  AM-PAC 6 Clicks Daily Activity (OT)  -SK        User Key  (r) = Recorded By, (t) = Taken By, (c) = Cosigned By    Initials Name Provider Type    Gloria Duncan OT Occupational Therapist          Time Calculation:   Time Calculation- OT     Row Name 09/03/19 1259             Time Calculation- OT    OT Start Time  0850  -SK      OT Stop Time  0906  -SK      OT Time Calculation (min)  16 min  -SK      Total Timed Code Minutes- OT  10 minute(s)  -SK      OT Goal Re-Cert Due Date  09/10/19  -SK        User Key  (r) = Recorded By, (t) = Taken By, (c) = Cosigned By    Initials Name Provider Type    Gloria Duncan OT Occupational Therapist        Therapy Charges for Today     Code Description Service Date Service Provider Modifiers Qty    09508729313 HC OT EVAL LOW COMPLEXITY 2 9/3/2019 Gloria Lucas OT GO 1    18798870088 HC OT THER SUPP EA 15 MIN 9/3/2019 Gloria Lucas OT GO 1    37987939521 HC OT SELF CARE/MGMT/TRAIN EA 15 MIN 9/3/2019 Gloria Lucas OT GO 1               Gloria Lucas OT  9/3/2019

## 2019-09-03 NOTE — CONSULTS
"IDENTIFYING INFORMATION: My H&P the patient is a 73-year-old  white female admitted on 8/31/2017 after suffering a fall at home.  She is seen related to increasing paranoia and visual hallucinations.    CHIEF COMPLAINT: None given    INFORMANT: Patient staff and chart    RELIABILITY: Limited    HISTORY OF PRESENT ILLNESS: The patient is a 73-year-old white female admitted on 8/31/2019 after she suffered a fall at home.  She has had multiple recent falls.  The patient had been on baclofen but this medication was abruptly stopped approximately 4 weeks ago.  She has a history of psychiatric treatment of the care of  and had been prescribed Seroquel.  The patient is become increasingly confused and is actively hallucinating during today's interview.  She reports that her  has  her left alone some of money which she states is \"quite over there next to where that little girl is sitting.  Obviously neither a little girl or a lump sum of money are present in the room.  The patient also reports that there are several expensive watches in the same area.  There are none.  The patient is reporting that her  has  her and plans to  another woman in [place] in the near future.  None of this is true.  This is apparently nowhere near the patient's previous baseline.  She lives with her  and is retired.    PAST PSYCHIATRIC HISTORY: Patient does have a history of previous psychiatric treatment    PAST MEDICAL HISTORY: Significant for hypothyroidism dyslipidemia and hypertension.  The patient is also had \"seizure-like activity\" which is been worked up by neurology.    MEDICATIONS:   Current Facility-Administered Medications   Medication Dose Route Frequency Provider Last Rate Last Dose   • acetaminophen (TYLENOL) tablet 650 mg  650 mg Oral Q4H PRN Miles Narayan MD   650 mg at 09/01/19 0835    Or   • acetaminophen (TYLENOL) 160 MG/5ML solution 650 mg  650 mg Oral Q4H PRN " Miles Narayan MD        Or   • acetaminophen (TYLENOL) suppository 650 mg  650 mg Rectal Q4H PRN Miles Narayan MD       • aspirin EC tablet 81 mg  81 mg Oral Daily Miles Narayan MD   81 mg at 09/03/19 0818   • atorvastatin (LIPITOR) tablet 10 mg  10 mg Oral Daily Miles Narayan MD   10 mg at 09/03/19 0818   • carvedilol (COREG) tablet 3.125 mg  3.125 mg Oral BID With Meals Miles Narayan MD   3.125 mg at 09/03/19 0818   • levothyroxine (SYNTHROID, LEVOTHROID) tablet 88 mcg  88 mcg Oral Q AM Hank Comer MD   88 mcg at 09/03/19 0633   • OLANZapine (zyPREXA) tablet 5 mg  5 mg Oral Q12H PRN Mirna Robison APRN   5 mg at 09/03/19 0940   • ondansetron (ZOFRAN) injection 4 mg  4 mg Intravenous Q6H PRN Miles Narayan MD   4 mg at 09/02/19 1108   • potassium chloride (MICRO-K) CR capsule 20 mEq  20 mEq Oral Daily Miles Narayan MD   20 mEq at 09/03/19 0817   • QUEtiapine (SEROquel) tablet 100 mg  100 mg Oral Nightly Mirna Robison APRN   100 mg at 09/02/19 2030   • sodium chloride 0.9 % flush 10 mL  10 mL Intravenous Q12H Miles Narayan MD   10 mL at 09/03/19 0818   • sodium chloride 0.9 % flush 10 mL  10 mL Intravenous PRN Miles Narayan MD             ALLERGIES:Pcn    FAMILY HISTORY: Noncontributory    SOCIAL HISTORY: Patient lives with her .  She is retired.  There is no reported use of alcohol tobacco or street drugs.    MENTAL STATUS EXAM: The patient is an elderly white female appearing her stated age.  She is dressed in hospital garb.  She is awake and alert but does not comply with testing of orientation.  Her mood is irritable and angry her affect congruent.  Speech is generally relevant coherent.  The patient does not comply with formal testing of memory and cognition.  She is actively hallucinating during interview.  She denies suicidal or homicidal thinking.  She also exhibits paranoid delusional thinking.  Her judgment and insight are very much  impaired.    ASSETS/LIABILITIES: To be assessed    DIAGNOSTIC IMPRESSION: Delirium, etiology uncertain, history of psychotic disorder unspecified, hypertension dyslipidemia hypothyroidism    PLAN: Once medically stable, if the patient's symptoms of psychosis do not resolve, admission to see me may need to be considered.  In the meantime I would continue her previous prescribed medications including Seroquel and PRN Zyprexa.  Thank you very much for the opportunity to see this patient

## 2019-09-03 NOTE — PLAN OF CARE
Problem: Patient Care Overview  Goal: Plan of Care Review  Outcome: Ongoing (interventions implemented as appropriate)   09/02/19 0624 09/03/19 0440   Coping/Psychosocial   Plan of Care Reviewed With --  patient   OTHER   Outcome Summary VSS though having worsening confusion and hallucinations. Seroquel restared yesterday and PRN zyprexa. Pt has restless and not slept all night. At one point pt became very angry/agressive/tearful and loud screaming in the hallway. will CTM and provide care. --

## 2019-09-03 NOTE — PLAN OF CARE
Problem: Patient Care Overview  Goal: Plan of Care Review   09/03/19 1526   Coping/Psychosocial   Plan of Care Reviewed With patient   OTHER   Outcome Summary Pt. 74 YO female presents to hospital with h/o recent fall at home and c/o auditory/visual hallucinations. Pt. diagnosed with UTI and Acute kidney injury. pt presents to OT with generalized weakness, decreased endurance, some confusion with activity resulting in decreased ind with ADLs. Pt perform bed mobility with CGA, CGA sit to stand, transfer to toilet and perform toileting with CGA/Min A secondary to increased unsteadiness noted, grooming upright at sink with set-up/CGA. Pt will benefit from skilled OT ot address deficits and inc jefe with ADLs.

## 2019-09-04 ENCOUNTER — HOSPITAL ENCOUNTER (INPATIENT)
Facility: HOSPITAL | Age: 74
LOS: 2 days | Discharge: HOME | End: 2019-09-06
Attending: SPECIALIST | Admitting: SPECIALIST

## 2019-09-04 VITALS
WEIGHT: 176.6 LBS | HEART RATE: 96 BPM | SYSTOLIC BLOOD PRESSURE: 134 MMHG | TEMPERATURE: 98 F | OXYGEN SATURATION: 96 % | HEIGHT: 66 IN | DIASTOLIC BLOOD PRESSURE: 83 MMHG | BODY MASS INDEX: 28.38 KG/M2 | RESPIRATION RATE: 18 BRPM

## 2019-09-04 PROBLEM — E53.8 B12 DEFICIENCY: Status: ACTIVE | Noted: 2019-09-04

## 2019-09-04 PROBLEM — F29 PSYCHOTIC DISORDER (HCC): Status: ACTIVE | Noted: 2019-09-04

## 2019-09-04 PROCEDURE — 25010000002 CYANOCOBALAMIN PER 1000 MCG: Performed by: NURSE PRACTITIONER

## 2019-09-04 PROCEDURE — 25010000002 ZIPRASIDONE MESYLATE PER 10 MG: Performed by: SPECIALIST

## 2019-09-04 RX ORDER — QUETIAPINE FUMARATE 100 MG/1
100 TABLET, FILM COATED ORAL NIGHTLY
Status: ON HOLD
Start: 2019-09-04 | End: 2019-09-06

## 2019-09-04 RX ORDER — QUETIAPINE FUMARATE 100 MG/1
100 TABLET, FILM COATED ORAL NIGHTLY
Status: DISCONTINUED | OUTPATIENT
Start: 2019-09-04 | End: 2019-09-06 | Stop reason: HOSPADM

## 2019-09-04 RX ORDER — LEVOTHYROXINE SODIUM 88 UG/1
88 TABLET ORAL DAILY
Status: ON HOLD
Start: 2019-09-04 | End: 2019-09-06

## 2019-09-04 RX ORDER — ZIPRASIDONE MESYLATE 20 MG/ML
20 INJECTION, POWDER, LYOPHILIZED, FOR SOLUTION INTRAMUSCULAR ONCE
Status: COMPLETED | OUTPATIENT
Start: 2019-09-04 | End: 2019-09-04

## 2019-09-04 RX ORDER — ASPIRIN 81 MG/1
81 TABLET, CHEWABLE ORAL DAILY
Status: DISCONTINUED | OUTPATIENT
Start: 2019-09-04 | End: 2019-09-06 | Stop reason: HOSPADM

## 2019-09-04 RX ORDER — LORAZEPAM 2 MG/ML
2 INJECTION INTRAMUSCULAR ONCE
Status: DISCONTINUED | OUTPATIENT
Start: 2019-09-04 | End: 2019-09-04 | Stop reason: HOSPADM

## 2019-09-04 RX ORDER — POTASSIUM CHLORIDE 750 MG/1
40 CAPSULE, EXTENDED RELEASE ORAL DAILY
Status: DISCONTINUED | OUTPATIENT
Start: 2019-09-04 | End: 2019-09-06 | Stop reason: HOSPADM

## 2019-09-04 RX ORDER — CARVEDILOL 6.25 MG/1
6.25 TABLET ORAL 2 TIMES DAILY WITH MEALS
Status: DISCONTINUED | OUTPATIENT
Start: 2019-09-04 | End: 2019-09-06 | Stop reason: HOSPADM

## 2019-09-04 RX ORDER — OLANZAPINE 5 MG/1
5 TABLET ORAL EVERY 12 HOURS PRN
Status: ON HOLD
Start: 2019-09-04 | End: 2019-09-06

## 2019-09-04 RX ORDER — LOPERAMIDE HYDROCHLORIDE 2 MG/1
2 CAPSULE ORAL
Status: DISCONTINUED | OUTPATIENT
Start: 2019-09-04 | End: 2019-09-06 | Stop reason: HOSPADM

## 2019-09-04 RX ORDER — ZONISAMIDE 100 MG/1
100 CAPSULE ORAL DAILY
Status: DISCONTINUED | OUTPATIENT
Start: 2019-09-04 | End: 2019-09-06 | Stop reason: HOSPADM

## 2019-09-04 RX ORDER — NITROGLYCERIN 0.4 MG/1
0.4 TABLET SUBLINGUAL
Status: DISCONTINUED | OUTPATIENT
Start: 2019-09-04 | End: 2019-09-06 | Stop reason: HOSPADM

## 2019-09-04 RX ORDER — PANTOPRAZOLE SODIUM 40 MG/1
40 TABLET, DELAYED RELEASE ORAL
Status: DISCONTINUED | OUTPATIENT
Start: 2019-09-05 | End: 2019-09-06 | Stop reason: HOSPADM

## 2019-09-04 RX ORDER — CARVEDILOL 6.25 MG/1
6.25 TABLET ORAL 2 TIMES DAILY WITH MEALS
Status: ON HOLD
Start: 2019-09-04 | End: 2019-09-06

## 2019-09-04 RX ORDER — CYANOCOBALAMIN 1000 UG/ML
1000 INJECTION, SOLUTION INTRAMUSCULAR; SUBCUTANEOUS
Status: DISCONTINUED | OUTPATIENT
Start: 2019-09-05 | End: 2019-09-06 | Stop reason: HOSPADM

## 2019-09-04 RX ORDER — LEVOTHYROXINE SODIUM 88 UG/1
88 TABLET ORAL
Status: DISCONTINUED | OUTPATIENT
Start: 2019-09-05 | End: 2019-09-06 | Stop reason: HOSPADM

## 2019-09-04 RX ORDER — ERGOCALCIFEROL 1.25 MG/1
50000 CAPSULE ORAL
Status: DISCONTINUED | OUTPATIENT
Start: 2019-09-05 | End: 2019-09-06 | Stop reason: HOSPADM

## 2019-09-04 RX ORDER — ALBUTEROL SULFATE 2.5 MG/3ML
2.5 SOLUTION RESPIRATORY (INHALATION) EVERY 6 HOURS PRN
Status: DISCONTINUED | OUTPATIENT
Start: 2019-09-04 | End: 2019-09-06 | Stop reason: HOSPADM

## 2019-09-04 RX ORDER — MONTELUKAST SODIUM 10 MG/1
10 TABLET ORAL NIGHTLY
Status: DISCONTINUED | OUTPATIENT
Start: 2019-09-04 | End: 2019-09-06 | Stop reason: HOSPADM

## 2019-09-04 RX ORDER — OLANZAPINE 5 MG/1
5 TABLET ORAL EVERY 12 HOURS PRN
Status: DISCONTINUED | OUTPATIENT
Start: 2019-09-04 | End: 2019-09-06 | Stop reason: HOSPADM

## 2019-09-04 RX ORDER — CYANOCOBALAMIN 1000 UG/ML
1000 INJECTION, SOLUTION INTRAMUSCULAR; SUBCUTANEOUS DAILY
Qty: 3 ML | Refills: 0 | Status: ON HOLD
Start: 2019-09-05 | End: 2019-09-06

## 2019-09-04 RX ADMIN — QUETIAPINE FUMARATE 100 MG: 100 TABLET ORAL at 21:14

## 2019-09-04 RX ADMIN — OLANZAPINE 5 MG: 5 TABLET, FILM COATED ORAL at 14:40

## 2019-09-04 RX ADMIN — MONTELUKAST SODIUM 10 MG: 10 TABLET, FILM COATED ORAL at 21:15

## 2019-09-04 RX ADMIN — POTASSIUM CHLORIDE 40 MEQ: 750 CAPSULE, EXTENDED RELEASE ORAL at 21:14

## 2019-09-04 RX ADMIN — CARVEDILOL 6.25 MG: 6.25 TABLET, FILM COATED ORAL at 17:02

## 2019-09-04 RX ADMIN — POTASSIUM CHLORIDE 20 MEQ: 750 CAPSULE, EXTENDED RELEASE ORAL at 08:49

## 2019-09-04 RX ADMIN — ZONISAMIDE 100 MG: 100 CAPSULE ORAL at 21:15

## 2019-09-04 RX ADMIN — CYANOCOBALAMIN 1000 MCG: 1000 INJECTION, SOLUTION INTRAMUSCULAR; SUBCUTANEOUS at 08:51

## 2019-09-04 RX ADMIN — ZIPRASIDONE MESYLATE 20 MG: 20 INJECTION, POWDER, LYOPHILIZED, FOR SOLUTION INTRAMUSCULAR at 17:02

## 2019-09-04 RX ADMIN — ASPIRIN 81 MG: 81 TABLET, COATED ORAL at 08:49

## 2019-09-04 RX ADMIN — LEVOTHYROXINE SODIUM 88 MCG: 88 TABLET ORAL at 06:04

## 2019-09-04 RX ADMIN — ASPIRIN 81 MG: 81 TABLET, CHEWABLE ORAL at 21:15

## 2019-09-04 RX ADMIN — ATORVASTATIN CALCIUM 10 MG: 10 TABLET, FILM COATED ORAL at 08:49

## 2019-09-04 RX ADMIN — CARVEDILOL 6.25 MG: 6.25 TABLET, FILM COATED ORAL at 21:14

## 2019-09-04 RX ADMIN — CARVEDILOL 6.25 MG: 6.25 TABLET, FILM COATED ORAL at 08:49

## 2019-09-04 NOTE — DISCHARGE SUMMARY
Date of Admission: 8/31/2019  Date of Discharge:  9/4/2019  Primary Care Physician: Sarah Samano MD     Discharge Diagnosis:  Active Hospital Problems    Diagnosis  POA   • **CECILE (acute kidney injury) (CMS/HCC) [N17.9]  Yes   • B12 deficiency [E53.8]  Yes   • Hallucinations [R44.3]  Yes   • Chronic back pain [M54.9, G89.29]  Yes   • Coronary artery disease [I25.10]  Yes   • Hypothyroidism (acquired) [E03.9]  Yes   • Seizures (CMS/HCC) [R56.9]  Yes   • Hyponatremia [E87.1]  Yes   • Metabolic acidosis [E87.2]  Yes   • Head injury, acute, sequela [S09.90XS]  Not Applicable   • Weakness [R53.1]  Yes   • Metabolic encephalopathy [G93.41]  Yes      Resolved Hospital Problems   No resolved problems to display.       Presenting Problem/History of Present Illness:  Hallucinations [R44.3]  Metabolic acidosis [E87.2]  Acute renal failure, unspecified acute renal failure type (CMS/HCC) [N17.9]     Hospital Course:  The patient is a 73 y.o. female with a history of seizure disorder who presented with hallucinations, delusions, and confusion 7d following a mechanical fall at home.  Please see admission H&P from 8/31/19 for further details.  She was recently treated for a UTI but had no evidence of this or any other infection on admission.  She was found to be B12 deficient and her TSH was low which was iatrogenic.  She was started on B12 replacement and her levothyroxine was decreased-she should follow-up a TSH with her PCP in 4-6 weeks.    Neurology evaluated the patient.  Her head CT and RPR were negative. Her pramipexole was discontinued.  She will be continued on zonisamide for seizure d/o and will need to follow up with her neurologist at [facility].  Psychiatry evaluated the patient and made some adjustments to her neuroleptics as below.  She is medically stable and will be transferred to the [facility] today.    Exam Today:  Blood pressure 109/94, pulse 116, temperature 98 °F (36.7 °C), temperature source Oral, resp. rate 16,  "height 167.6 cm (66\"), weight 80.1 kg (176 lb 9.6 oz), SpO2 99 %.  General Appearance:  Comfortable and in no acute distress  Lungs:  Normal effort and normal respiratory rate.    Heart: Normal rate.  Regular rhythm.    Abdomen: Abdomen is soft.  Bowel sounds are normal.   There is no abdominal tenderness.     Extremities: There is no dependent edema or local swelling.    Neurological: Confused, no focal deficits  Skin:  Warm and dry.      Consults:   Consults     Date and Time Order Name Status Description    9/2/2019 1321 Inpatient Psychiatrist Consult Completed     8/31/2019 2032 Inpatient Neurology Consult General Completed     8/31/2019 3024 [facility] (on-call MD unless specified) Details Completed            Discharge Disposition:  Psychiatric Hospital or Unit ([facility])    Discharge Medications:     Discharge Medications      New Medications      Instructions Start Date   cyanocobalamin 1000 MCG/ML injection   1,000 mcg, Intramuscular, Daily   Start Date:  9/5/2019     OLANZapine 5 MG tablet  Commonly known as:  zyPREXA   5 mg, Oral, Every 12 Hours PRN         Changes to Medications      Instructions Start Date   carvedilol 6.25 MG tablet  Commonly known as:  COREG  What changed:    · medication strength  · how much to take   6.25 mg, Oral, 2 Times Daily With Meals      levothyroxine 88 MCG tablet  Commonly known as:  SYNTHROID, LEVOTHROID  What changed:    · medication strength  · how much to take   88 mcg, Oral, Daily      QUEtiapine 100 MG tablet  Commonly known as:  SEROquel  What changed:    · medication strength  · how much to take   100 mg, Oral, Nightly         Continue These Medications      Instructions Start Date   albuterol sulfate  (90 Base) MCG/ACT inhaler  Commonly known as:  PROVENTIL HFA;VENTOLIN HFA;PROAIR HFA   2 puffs, Inhalation, Every 6 Hours PRN      aspirin 81 MG EC tablet   81 mg, Oral, Daily      atorvastatin 10 MG tablet  Commonly known as:  LIPITOR   10 mg, Oral, " Daily      ergocalciferol 8000 UNIT/ML drops  Commonly known as:  DRISDOL   Oral, Daily      vitamin D 94329 units capsule capsule  Commonly known as:  ERGOCALCIFEROL   50,000 Units, Oral, Weekly      montelukast 10 MG tablet  Commonly known as:  SINGULAIR   10 mg, Oral, Nightly      nitroglycerin 0.4 MG SL tablet  Commonly known as:  NITROSTAT   0.4 mg, Sublingual, Every 5 Minutes PRN, Take no more than 3 doses in 15 minutes.       pantoprazole 40 MG EC tablet  Commonly known as:  PROTONIX   40 mg, Oral, Daily      potassium chloride 10 MEQ ER tablet  Commonly known as:  K-DUR,KLOR-CON   20 mEq, Oral, Daily      zonisamide 100 MG capsule  Commonly known as:  ZONEGRAN   200 mg, Oral, Nightly         Stop These Medications    pramipexole 0.125 MG tablet  Commonly known as:  MIRAPEX     sulfamethoxazole-trimethoprim 800-160 MG per tablet  Commonly known as:  BACTRIM DS,SEPTRA DS     VENLAFAXINE HCL ER PO            Discharge Diet:  Regular    Activity at Discharge: As tolerated    Jose Rowe MD  09/04/19  1:33 PM    Time Spent on Discharge Activities: Greater than 30 minutes.

## 2019-09-04 NOTE — NURSING NOTE
"Pt has been delusional with some hallucinations majority of shift today, was able to redirect patient in earlier part of shift. Gave Zyprexa 5 mg at 1440 with no change. Around 1600 pt started yelling help me \"someone is chasing me and trying to kill me\". Hallucinations consist of seeing people, objects and that she has been cut. Geodon 20 mg IM x 1 given at 1700. Pt being transferred to [facility] - Report called to Nilda VILLANUEVA. 9/4/19 @ 7021  "

## 2019-09-04 NOTE — PLAN OF CARE
Problem: Patient Care Overview  Goal: Plan of Care Review  Outcome: Ongoing (interventions implemented as appropriate)   09/04/19 0582   Coping/Psychosocial   Plan of Care Reviewed With patient   Plan of Care Review   Progress no change   OTHER   Outcome Summary pt continues to be delusional and hallucinate continuously. Patient continues to not sleep and gets out of bed frequently. Medication doesn't seem to offer any assistance. Awaiting placement in [facility]. Will continue to monitor closely

## 2019-09-04 NOTE — PROGRESS NOTES
Met w/ patient and spoke w/  Wilian by telephone. Patient has been medically cleared discharged by medical.  Wilian gave verbal permission for patient to be admitted to [facility].  Patient was confused, rambling, O to person only. She was ask if she was willing to come to the psychiatric unit and said yes and began talking about getting sleep.  There is no SI or HI.  Discussed w/ Dr. Harkins and patient need sitter. He saw patient yesterday in consult.

## 2019-09-05 LAB
ALBUMIN SERPL-MCNC: 4.5 G/DL (ref 3.5–5.2)
ALBUMIN/GLOB SERPL: 1.8 G/DL
ALP SERPL-CCNC: 84 U/L (ref 39–117)
ALT SERPL W P-5'-P-CCNC: 11 U/L (ref 1–33)
ANION GAP SERPL CALCULATED.3IONS-SCNC: 18.5 MMOL/L (ref 5–15)
AST SERPL-CCNC: 19 U/L (ref 1–32)
BASOPHILS # BLD AUTO: 0.01 10*3/MM3 (ref 0–0.2)
BASOPHILS NFR BLD AUTO: 0.2 % (ref 0–1.5)
BILIRUB SERPL-MCNC: 0.3 MG/DL (ref 0.2–1.2)
BUN BLD-MCNC: 18 MG/DL (ref 8–23)
BUN/CREAT SERPL: 24.7 (ref 7–25)
CALCIUM SPEC-SCNC: 9.2 MG/DL (ref 8.6–10.5)
CHLORIDE SERPL-SCNC: 106 MMOL/L (ref 98–107)
CO2 SERPL-SCNC: 16.5 MMOL/L (ref 22–29)
CREAT BLD-MCNC: 0.73 MG/DL (ref 0.57–1)
DEPRECATED RDW RBC AUTO: 52 FL (ref 37–54)
EOSINOPHIL # BLD AUTO: 0 10*3/MM3 (ref 0–0.4)
EOSINOPHIL NFR BLD AUTO: 0 % (ref 0.3–6.2)
ERYTHROCYTE [DISTWIDTH] IN BLOOD BY AUTOMATED COUNT: 14.8 % (ref 12.3–15.4)
GFR SERPL CREATININE-BSD FRML MDRD: 79 ML/MIN/1.73
GLOBULIN UR ELPH-MCNC: 2.5 GM/DL
GLUCOSE BLD-MCNC: 77 MG/DL (ref 65–99)
HCT VFR BLD AUTO: 41.8 % (ref 34–46.6)
HGB BLD-MCNC: 12.8 G/DL (ref 12–15.9)
IMM GRANULOCYTES # BLD AUTO: 0.01 10*3/MM3 (ref 0–0.05)
IMM GRANULOCYTES NFR BLD AUTO: 0.2 % (ref 0–0.5)
LYMPHOCYTES # BLD AUTO: 1.72 10*3/MM3 (ref 0.7–3.1)
LYMPHOCYTES NFR BLD AUTO: 36.4 % (ref 19.6–45.3)
MCH RBC QN AUTO: 29 PG (ref 26.6–33)
MCHC RBC AUTO-ENTMCNC: 30.6 G/DL (ref 31.5–35.7)
MCV RBC AUTO: 94.8 FL (ref 79–97)
MONOCYTES # BLD AUTO: 0.55 10*3/MM3 (ref 0.1–0.9)
MONOCYTES NFR BLD AUTO: 11.7 % (ref 5–12)
NEUTROPHILS # BLD AUTO: 2.43 10*3/MM3 (ref 1.7–7)
NEUTROPHILS NFR BLD AUTO: 51.5 % (ref 42.7–76)
NRBC BLD AUTO-RTO: 0 /100 WBC (ref 0–0.2)
PLATELET # BLD AUTO: 180 10*3/MM3 (ref 140–450)
PMV BLD AUTO: 10.6 FL (ref 6–12)
POTASSIUM BLD-SCNC: 4 MMOL/L (ref 3.5–5.2)
PROT SERPL-MCNC: 7 G/DL (ref 6–8.5)
RBC # BLD AUTO: 4.41 10*6/MM3 (ref 3.77–5.28)
SODIUM BLD-SCNC: 141 MMOL/L (ref 136–145)
T4 FREE SERPL-MCNC: 1.47 NG/DL (ref 0.93–1.7)
TSH SERPL DL<=0.05 MIU/L-ACNC: 0.04 UIU/ML (ref 0.27–4.2)
WBC NRBC COR # BLD: 4.72 10*3/MM3 (ref 3.4–10.8)

## 2019-09-05 PROCEDURE — 80053 COMPREHEN METABOLIC PANEL: CPT | Performed by: SPECIALIST

## 2019-09-05 PROCEDURE — 97161 PT EVAL LOW COMPLEX 20 MIN: CPT

## 2019-09-05 PROCEDURE — 84439 ASSAY OF FREE THYROXINE: CPT | Performed by: SPECIALIST

## 2019-09-05 PROCEDURE — 85025 COMPLETE CBC W/AUTO DIFF WBC: CPT | Performed by: SPECIALIST

## 2019-09-05 PROCEDURE — 25010000002 CYANOCOBALAMIN PER 1000 MCG: Performed by: SPECIALIST

## 2019-09-05 PROCEDURE — 84443 ASSAY THYROID STIM HORMONE: CPT | Performed by: SPECIALIST

## 2019-09-05 RX ORDER — ACETAMINOPHEN 325 MG/1
650 TABLET ORAL EVERY 6 HOURS PRN
Status: DISCONTINUED | OUTPATIENT
Start: 2019-09-05 | End: 2019-09-06 | Stop reason: HOSPADM

## 2019-09-05 RX ADMIN — CARVEDILOL 6.25 MG: 6.25 TABLET, FILM COATED ORAL at 00:00

## 2019-09-05 RX ADMIN — LEVOTHYROXINE SODIUM 88 MCG: 88 TABLET ORAL at 10:00

## 2019-09-05 RX ADMIN — CARVEDILOL 6.25 MG: 6.25 TABLET, FILM COATED ORAL at 18:15

## 2019-09-05 RX ADMIN — PANTOPRAZOLE SODIUM 40 MG: 40 TABLET, DELAYED RELEASE ORAL at 10:01

## 2019-09-05 RX ADMIN — ERGOCALCIFEROL 50000 UNITS: 1.25 CAPSULE ORAL at 09:59

## 2019-09-05 RX ADMIN — ASPIRIN 81 MG: 81 TABLET, CHEWABLE ORAL at 10:01

## 2019-09-05 RX ADMIN — CYANOCOBALAMIN 1000 MCG: 1000 INJECTION, SOLUTION INTRAMUSCULAR; SUBCUTANEOUS at 10:01

## 2019-09-05 RX ADMIN — MONTELUKAST SODIUM 10 MG: 10 TABLET, FILM COATED ORAL at 21:10

## 2019-09-05 RX ADMIN — QUETIAPINE FUMARATE 100 MG: 100 TABLET ORAL at 21:10

## 2019-09-05 RX ADMIN — POTASSIUM CHLORIDE 40 MEQ: 750 CAPSULE, EXTENDED RELEASE ORAL at 09:58

## 2019-09-05 RX ADMIN — ACETAMINOPHEN 650 MG: 325 TABLET, FILM COATED ORAL at 18:15

## 2019-09-05 RX ADMIN — ZONISAMIDE 100 MG: 100 CAPSULE ORAL at 13:12

## 2019-09-05 NOTE — PROGRESS NOTES
Continued Stay Note  [facility]     Patient Name: Lana Frankel  MRN: [id number]  Today's Date: 9/5/2019    Admit Date: 9/4/2019    Discharge Plan     Row Name 09/05/19 1606       Plan    Plan Comments  SW called and spoke w/Wilian Frankel, pt's spouse, ph. [phone] to discuss pt's tx & plans at d/c.  Explained role of SW.  Informed Mr. Frankel that the Doctor has ordered neuropsychological testing for pt; gave a brief overview of the testing.  Discussed pt's estimated length of stay.         Discharge Codes    No documentation.             DHRUV Mejia

## 2019-09-05 NOTE — PLAN OF CARE
Problem: Patient Care Overview  Goal: Plan of Care Review  Outcome: Ongoing (interventions implemented as appropriate)   09/05/19 0345 09/05/19 0503   Coping/Psychosocial   Plan of Care Reviewed With patient --    Coping/Psychosocial   Patient Agreement with Plan of Care unable to participate --    Plan of Care Review   Progress --  no change   OTHER   Outcome Summary --  Pt is confused and oriented to self only. Pt is restless but redirectable. Pt is able to answer some assessment questions but often the responses are illogical or tangential at best. Pt is compliant with her meds whole with water. Pt slept all night. 9/5/19 0521        Problem: Overarching Goals (Adult)  Goal: Adheres to Safety Considerations for Self and Others  Outcome: Ongoing (interventions implemented as appropriate)   09/05/19 0503   Overarching Goals (Adult)   Adheres to Safety Considerations for Self and Others making progress toward outcome     Goal: Optimized Coping Skills in Response to Life Stressors  Outcome: Ongoing (interventions implemented as appropriate)   09/05/19 0503   Overarching Goals (Adult)   Optimized Coping Skills in Response to Life Stressors making progress toward outcome     Goal: Develops/Participates in Therapeutic [place] to Support Successful Transition  Outcome: Ongoing (interventions implemented as appropriate)   09/05/19 0503   Overarching Goals (Adult)   Develops/Participates in Therapeutic [place] to Support Successful Transition making progress toward outcome

## 2019-09-05 NOTE — PLAN OF CARE
Problem: Patient Care Overview  Goal: Plan of Care Review  Outcome: Ongoing (interventions implemented as appropriate)   09/04/19 2132   Coping/Psychosocial   Plan of Care Reviewed With patient   Coping/Psychosocial   Patient Agreement with Plan of Care agrees   Plan of Care Review   Progress improving   OTHER   Outcome Summary Patient is confused and thinks she is at the court house to give a disposition. Patient easily redirects from unwanted behavior, Patient arrived on the unit escorted by transportation at 1805. Will continue to monitor. Patient assessment was very limited because of patient's in ability to answer questions appropriately.       Problem: Overarching Goals (Adult)  Goal: Develops/Participates in Therapeutic [place] to Support Successful Transition    Intervention: Foster Therapeutic [place]   09/04/19 2132   Interventions   Trust Relationship/Rapport care explained;choices provided;emotional support provided;questions answered;empathic listening provided;questions encouraged;reassurance provided;thoughts/feelings acknowledged         Problem: Cognitive Impairment (Psychotic Signs/Symptoms) (Pediatric)  Goal: Improved Thought Clarity/Organization (Psychotic Signs/Symptoms)  Outcome: Ongoing (interventions implemented as appropriate)      Problem: Sensory Perception Impairment (Psychotic Signs/Symptoms) (Pediatric)  Goal: Decrease Frequency/Intensity of Sensory Symptoms(Psychotic Signs/Symptoms)  Outcome: Ongoing (interventions implemented as appropriate)      Problem: Psychomotor Movement Impairment (Psychotic Signs/Symptoms) (Pediatric)  Intervention: Manage Psychomotor Movement   09/04/19 2132   Manage Psychomotor Movement   Mutually Determined Action Steps (Manage Psychomotor Movement) adheres to medication regimen         Problem: Mental State/Mood Impairment (Psychotic Signs/Symptoms) (Pediatric)  Intervention: Optimize Mental State and Mood   09/04/19 2132   Optimize Mental State and Mood    Mutually Determined Action Steps (Optimize Mental State/Mood) acknowledges progress;identifies thought distortion;verbalizes increased insight       Goal: Improved Mental State/Mood (Psychotic Signs/Symptoms)  Outcome: Ongoing (interventions implemented as appropriate)   09/04/19 2132   Improved Mental State/Mood (Psychotic Signs/Symptoms)   Improved Mental State/Mood Action Step/Short Term Goal (STG) Established 09/04/19   Improved Mental State/Mood Time Frame for Action Step (STG) 4 days   Improved Mental State/Mood Action Step (STG) Outcome making progress toward outcome       Problem: Sleep Impairment (Psychotic Signs/Symptoms) (Pediatric)  Intervention: Promote Healthy Sleep Hygiene   09/04/19 2132   Promote Healthy Sleep Hygiene   Mutually Determined Action Steps (Promote Healthy Sleep Hygiene) remains out of bed during day   Promote Health Sleep Hygiene   Sleep Hygiene Promotion napping discouraged;noise level reduced;reading encouraged;regular sleep pattern promoted;relaxation techniques promoted         Problem: Skin Injury Risk (Adult)  Intervention: Prevent/Manage Excess Moisture   09/04/19 2037 09/04/19 2132   Hygiene Care   Perineal Care perineum cleansed --    Bathing/Skin Care --  incontinence care       Goal: Identify Related Risk Factors and Signs and Symptoms  Outcome: Ongoing (interventions implemented as appropriate)   09/04/19 2132   Skin Injury Risk (Adult)   Related Risk Factors (Skin Injury Risk) advanced age;moisture

## 2019-09-05 NOTE — H&P
IDENTIFYING INFORMATION: The patient is a 73-year-old white female admitted to the crisis management unit with increasing confusion and paranoia    CHIEF COMPLAINT: None given    INFORMANT: Chart, patient is sleeping does not awaken for interview    RELIABILITY: Good    HISTORY OF PRESENT ILLNESS: The patient is a 73-year-old  white female admitted with increasing confusion.  The patient suffered a fall at home and had been noted to have increasing paranoia and visual hallucinations.  When seen by this physician for consultation on 9/3/2019 the patient was noted to be actively hallucinating and was extremely paranoid, claiming that her  was planning to go to [place] [facility] of a another woman.  When seen today the patient is sleeping soundly does not awaken for interview.  For more complete history of present illness please refer to previous dictated notes.    PAST PSYCHIATRIC HISTORY: Reviewed no changes    PAST MEDICAL HISTORY: Reviewed no changes reviewed no changes    MEDICATIONS:   Current Facility-Administered Medications   Medication Dose Route Frequency Provider Last Rate Last Dose   • albuterol (PROVENTIL) nebulizer solution 0.083% 2.5 mg/3mL  2.5 mg Nebulization Q6H PRN Santhosh Harkins III, MD       • aspirin chewable tablet 81 mg  81 mg Oral Daily Santhosh Harkins III, MD   81 mg at 09/05/19 1001   • carvedilol (COREG) tablet 6.25 mg  6.25 mg Oral BID With Meals Santhosh Harkins III, MD   6.25 mg at 09/05/19 0000   • cyanocobalamin injection 1,000 mcg  1,000 mcg Intramuscular Q28 Days Santhosh Harkins III, MD   1,000 mcg at 09/05/19 1001   • levothyroxine (SYNTHROID, LEVOTHROID) tablet 88 mcg  88 mcg Oral Q AM Santhosh Harkins III, MD   88 mcg at 09/05/19 1000   • loperamide (IMODIUM) capsule 2 mg  2 mg Oral Q2H PRN Santhosh Harkins III, MD       • magnesium hydroxide (MILK OF MAGNESIA) suspension 2400 mg/10mL 10 mL  10 mL Oral  Daily PRN Santhosh Harkins III, MD       • montelukast (SINGULAIR) tablet 10 mg  10 mg Oral Nightly Santhosh Harkins III, MD   10 mg at 09/04/19 2115   • nitroglycerin (NITROSTAT) SL tablet 0.4 mg  0.4 mg Sublingual Q5 Min PRN Santhosh Harkins III, MD       • OLANZapine (zyPREXA) tablet 5 mg  5 mg Oral Q12H PRN Santhosh Harkins III, MD       • pantoprazole (PROTONIX) EC tablet 40 mg  40 mg Oral Q AM Santhosh Harkins III, MD   40 mg at 09/05/19 1001   • potassium chloride (MICRO-K) CR capsule 40 mEq  40 mEq Oral Daily Santhosh Harkins III, MD   40 mEq at 09/05/19 0958   • QUEtiapine (SEROquel) tablet 100 mg  100 mg Oral Nightly Santhosh Harkins III, MD   100 mg at 09/04/19 2114   • vitamin D (ERGOCALCIFEROL) capsule 50,000 Units  50,000 Units Oral Q7 Days Santhosh Harkins III, MD   50,000 Units at 09/05/19 0959   • zonisamide (ZONEGRAN) capsule 100 mg  100 mg Oral Daily Santhosh Harkins III, MD   100 mg at 09/04/19 2115         ALLERGIES: PCN    FAMILY HISTORY: Reviewed no changes    SOCIAL HISTORY: Reviewed no changes    MENTAL STATUS EXAM: The patient is an elderly white female.  She is sleeping soundly and multiple attempts to rouse her are unsuccessful.    ASSETS/LIABILITIES: To be assessed    DIAGNOSTIC IMPRESSION: Delirium, psychotic disorder unspecified, hypertension, dyslipidemia, hypothyroidism    PLAN: I suspect the patient's symptoms are probably related to early dementing illness.  I will ask that a neuropsychological evaluation to take place and will continue Seroquel as well as PRN olanzapine.  PT and OT will also be ordered.  Estimated length of stay in the hospital is 7 to 10 days.

## 2019-09-05 NOTE — THERAPY EVALUATION
Patient Name: Lana Frankel  : 1945    MRN: [id number]                              Today's Date: 2019       Admit Date: 2019    Visit Dx: No diagnosis found.  Patient Active Problem List   Diagnosis   • Hallucinations   • Chronic back pain   • Coronary artery disease   • Hypothyroidism (acquired)   • Seizures (CMS/HCC)   • CECILE (acute kidney injury) (CMS/HCC)   • Hyponatremia   • Metabolic acidosis   • Head injury, acute, sequela   • Weakness   • Metabolic encephalopathy   • B12 deficiency   • Psychotic disorder (CMS/HCC)     Past Medical History:   Diagnosis Date   • Chronic back pain    • Coronary artery disease    • Disease of thyroid gland    • Myocardial infarction (CMS/HCC)    • Seizures (CMS/HCC)      Past Surgical History:   Procedure Laterality Date   • CHOLECYSTECTOMY     • HYSTERECTOMY     • NASAL SEPTUM SURGERY     • REPLACEMENT TOTAL KNEE     • THYROID SURGERY       General Information     Row Name 19 1458          PT Evaluation Time/Intention    Document Type  evaluation  -MD     Mode of Treatment  physical therapy  -MD     Row Name 19 1458          General Information    Patient Profile Reviewed?  yes  -MD     Existing Precautions/Restrictions  fall  -MD     Row Name 19 1458          Cognitive Assessment/Intervention- PT/OT    Orientation Status (Cognition)  oriented to;person  -MD       User Key  (r) = Recorded By, (t) = Taken By, (c) = Cosigned By    Initials Name Provider Type    Blanca Sibley, PT Physical Therapist        Mobility     Row Name 19 1516          Bed Mobility Assessment/Treatment    Bed Mobility Assessment/Treatment  supine-sit-supine  -MD     Supine-Sit-Supine Stevens (Bed Mobility)  supervision  -MD     Assistive Device (Bed Mobility)  head of bed elevated;bed rails  -MD     Row Name 19 1516          Sit-Stand Transfer    Sit-Stand Stevens (Transfers)  contact guard;minimum assist (75% patient effort)  -MD     Row Name 19  1516          Gait/Stairs Assessment/Training    [place]th Level (Gait)  verbal cues;minimum assist (75% patient effort)  -MD     Assistive Device (Gait)  other (see comments) HHA  -MD     Distance in Feet (Gait)  80  -MD     Deviations/Abnormal Patterns (Gait)  bilateral deviations;base of support, narrow;francis decreased;festinating/shuffling;gait speed decreased  -MD       User Key  (r) = Recorded By, (t) = Taken By, (c) = Cosigned By    Initials Name Provider Type    Blanca Sibley, PT Physical Therapist        Obj/Interventions     Row Name 09/05/19 1518          General ROM    GENERAL ROM COMMENTS  B LE WFL  -MD     Row Name 09/05/19 1518          MMT (Manual Muscle Testing)    General MMT Comments  B LE grossly 3/5   -MD     Row Name 09/05/19 1518          Static Sitting Balance    Level of [place] (Unsupported Sitting, Static Balance)  supervision  -MD     Sitting Position (Unsupported Sitting, Static Balance)  sitting on edge of bed  -MD     Time Able to Maintain Position (Unsupported Sitting, Static Balance)  2 to 3 minutes  -MD       User Key  (r) = Recorded By, (t) = Taken By, (c) = Cosigned By    Initials Name Provider Type    Blanca Sibley, PT Physical Therapist        Goals/Plan     Row Name 09/05/19 1524          Transfer Goal 1 (PT)    Activity/Assistive Device (Transfer Goal 1, PT)  sit-to-stand/stand-to-sit  -MD     S[place]th Level/Cues Needed (Transfer Goal 1, PT)  supervision required  -MD     Time Frame (Transfer Goal 1, PT)  1 week  -MD     Row Name 09/05/19 1524          Gait Training Goal 1 (PT)    Activity/Assistive Device (Gait Training Goal 1, PT)  gait (walking locomotion)  -MD     [place]th Level (Gait Training Goal 1, PT)  supervision required  -MD     Distance (Gait Goal 1, PT)  200  -MD     Time Frame (Gait Training Goal 1, PT)  1 week  -MD       User Key  (r) = Recorded By, (t) = Taken By, (c) = Cosigned By    Initials Name Provider Type    Blanca Sibley, PT Physical  Therapist        Clinical Impression     Row Name 09/05/19 1520          Pain Assessment    Additional Documentation  Pain Scale: Numbers Pre/Post-Treatment (Group)  -MD     Row Name 09/05/19 1520          Pain Scale: Numbers Pre/Post-Treatment    Pain Scale: Numbers, Pretreatment  0/10 - no pain  -MD     Row Name 09/05/19 1520          Physical Therapy Clinical Impression    Criteria for Skilled Interventions Met (PT Clinical Impression)  yes  -MD     Rehab Potential (PT Clinical Summary)  good, to achieve stated therapy goals  -MD     Predicted Duration of Therapy (PT)  1 wk  -MD     Row Name 09/05/19 1520          Positioning and Restraints    Pre-Treatment Position  in bed  -MD     Post Treatment Position  bed  -MD     In Bed  supine;exit alarm on  -MD       User Key  (r) = Recorded By, (t) = Taken By, (c) = Cosigned By    Initials Name Provider Type    Blanca Sibley, PT Physical Therapist        Outcome Measures    No documentation.       Physical Therapy Education     Title: PT OT SLP Therapies (Done)     Topic: Physical Therapy (Done)     Point: Precautions (Done)     Learning Progress Summary           Patient Acceptance, E, VU by MD at 9/5/2019  3:25 PM                               User Key     Initials Effective Dates Name Provider Type Demetrius MILLER 04/03/18 -  Blanca Monique, PT Physical Therapist PT              PT Recommendation and Plan     Outcome Summary/Treatment Plan (PT)  Anticipated Discharge Disposition (PT): home with assist  Outcome Summary: Pt presents w impaired gait and impaired dynamic standing balance compromising pts safety w functional mobility.  Due to the above pt will benifit from skilled PT.     Time Calculation:   PT Charges     Row Name 09/05/19 1454             Time Calculation    Start Time  1434  -MD      Stop Time  1445  -MD      Time Calculation (min)  11 min  -MD      PT Received On  09/05/19  -MD      PT - Next Appointment  09/06/19  -MD      PT Goal Re-Cert Due Date   09/12/19  -MD        User Key  (r) = Recorded By, (t) = Taken By, (c) = Cosigned By    Initials Name Provider Type    Blanca Sibley, PT Physical Therapist        Therapy Charges for Today     Code Description Service Date Service Provider Modifiers Qty    11863992965 HC PT EVAL LOW COMPLEXITY 2 9/5/2019 Blanca Monique, PT GP 1          PT G-Codes  Outcome Measure Options: AM-PAC 6 Clicks Basic Mobility (PT)  AM-PAC 6 Clicks Score (PT): 20    Blanca Monique, PT  9/5/2019

## 2019-09-05 NOTE — PLAN OF CARE
Problem: Cognitive Impairment (Psychotic Signs/Symptoms) (Pediatric)  Goal: Improved Thought Clarity/Organization (Psychotic Signs/Symptoms)  Outcome: Ongoing (interventions implemented as appropriate)   09/05/19 0623   Improved Thought Clarity/Organization (Psychotic Signs/Symptoms)   Improved Thought Clarity/Organization Action Step/Short Term Goal (STG) Established 09/05/19   Improved Thought Clarity/Organization Time Frame for Action Step (STG) 4 days   Improved Thought Clarity/Organization Action Step (STG) Outcome making progress toward outcome       Problem: Sensory Perception Impairment (Psychotic Signs/Symptoms) (Pediatric)  Goal: Decrease Frequency/Intensity of Sensory Symptoms(Psychotic Signs/Symptoms)  Outcome: Ongoing (interventions implemented as appropriate)   09/05/19 0623   Decrease Frequency/Intensity of Sensory Symptoms(Psychotic Signs/Symptoms)   Decrease in Sensory Symptom Frequency/Intensity Action Step/Short Term Goal (STG) Established 09/05/19   Decrease in Sensory Symptom Frequency/Intensity Time Frame for Action Step (STG) 4 days   Decrease in Sensory Symptom Frequency/Intensity Action Step (STG) Outcome making progress toward outcome       Problem: Psychomotor Movement Impairment (Psychotic Signs/Symptoms) (Pediatric)  Goal: Improved Psychomotor Symptoms (Psychotic Signs/Symptoms)  Outcome: Ongoing (interventions implemented as appropriate)   09/05/19 0623   Improved Psychomotor Symptoms (Psychotic Signs/Symptoms)   Improved Psychomotor Symptoms Action Step/Short Term Goal (STG) Established 09/05/19   Improved Psychomotor Symptoms Time Frame for Action Step (STG) 4 days   Improved Psychomotor Symptoms Action Step (STG) Outcome making progress toward outcome       Problem: Mental State/Mood Impairment (Psychotic Signs/Symptoms) (Pediatric)  Goal: Improved Mental State/Mood (Psychotic Signs/Symptoms)  Outcome: Ongoing (interventions implemented as appropriate)   09/05/19 0623   Improved  Mental State/Mood (Psychotic Signs/Symptoms)   Improved Mental State/Mood Action Step/Short Term Goal (STG) Established 09/04/19   Improved Mental State/Mood Time Frame for Action Step (STG) 4 days   Improved Mental State/Mood Action Step (STG) Outcome making progress toward outcome       Problem: Sleep Impairment (Psychotic Signs/Symptoms) (Pediatric)  Goal: Improved Sleep Hygiene (Psychotic Signs/Symptoms)  Outcome: Ongoing (interventions implemented as appropriate)   09/05/19 0623   Improved Sleep Hygiene (Psychotic Signs/Symptoms)   Improved Sleep Hygiene Action Step/Short Term Goal (STG) Established 09/05/19   Improved Sleep Hygiene Time Frame for Action Step (STG) 4 days   Improved Sleep Hygiene Action Step (STG) Outcome making progress toward outcome

## 2019-09-05 NOTE — PLAN OF CARE
Problem: Patient Care Overview  Goal: Discharge Needs Assessment  Outcome: Ongoing (interventions implemented as appropriate)   09/05/19 1311 09/05/19 1313   Discharge Needs Assessment   Concerns to be Addressed mental health;discharge planning;coping/stress --    Patient/Family Anticipates Transition to home with family --    Patient/Family Anticipated Services at Transition mental health services --    Transportation Concerns car, none --    Discharge Coordination/Progress --  Pt will return home. Pt will receive neuropsych testing, PT & OT. SW will explore outpt providers & contact pt's family.

## 2019-09-05 NOTE — PLAN OF CARE
Problem: Patient Care Overview  Goal: Plan of Care Review   09/05/19 1526   OTHER   Outcome Summary Pt presents w impaired gait and impaired dynamic standing balance compromising pts safety w functional mobility. Due to the above pt will benifit from skilled PT.

## 2019-09-05 NOTE — PLAN OF CARE
Problem: Patient Care Overview  Goal: Individualization and Mutuality  Outcome: Ongoing (interventions implemented as appropriate)    Goal: Interprofessional Rounds/Family Conf  Outcome: Ongoing (interventions implemented as appropriate)   09/05/19 1026   Interdisciplinary Rounds/Family Conf   Summary Treatment team met to discuss pt's plan of care. Svcs needed will be assessed ongoing. SW will explore outpt mental health providers & d/c placement w/family. Pt's progress will be reviewed ongoing.   Interdisciplinary Rounds/Family Conf   Participants art therapy;;nursing;psychiatrist;pharmacy;social work     Patient/Guardian Signature: __________________________________            Psychiatrist Signature: ______________________________________             Therapist Signature: ________________________________________         Nurse Signature: ___________________________________________          Staff Signature: ____________________________________________            Staff Signature: ____________________________________________          Staff Signature: ____________________________________________          Staff Signature:                                                                                                      Problem: Cognitive Impairment (Psychotic Signs/Symptoms) (Pediatric)  Goal: Improved Thought Clarity/Organization (Psychotic Signs/Symptoms)  Outcome: Ongoing (interventions implemented as appropriate)      Problem: Sensory Perception Impairment (Psychotic Signs/Symptoms) (Pediatric)  Goal: Decrease Frequency/Intensity of Sensory Symptoms(Psychotic Signs/Symptoms)  Outcome: Ongoing (interventions implemented as appropriate)      Problem: Psychomotor Movement Impairment (Psychotic Signs/Symptoms) (Pediatric)  Goal: Improved Psychomotor Symptoms (Psychotic Signs/Symptoms)  Outcome: Ongoing (interventions implemented as appropriate)      Problem: Mental State/Mood Impairment (Psychotic  Signs/Symptoms) (Pediatric)  Goal: Improved Mental State/Mood (Psychotic Signs/Symptoms)  Outcome: Ongoing (interventions implemented as appropriate)      Problem: Sleep Impairment (Psychotic Signs/Symptoms) (Pediatric)  Goal: Improved Sleep Hygiene (Psychotic Signs/Symptoms)  Outcome: Ongoing (interventions implemented as appropriate)

## 2019-09-05 NOTE — CONSULTS
Consultation/Progress Note    Name: Lana Frankel ADMIT: 2019   : 1945  PCP: Sarah Samano MD    MRN: [id number] LOS: 1 days   AGE/SEX: 73 y.o. female  ROOM: [facility]   Date of Encounter Visit: 19    Subjective:     Interval History: patient was transferred to [facility] after recent hospitalization. Please see H&P and D/c summary from recent hospitalization.     Hospital Course from Discharge summary:  The patient is a 73 y.o. female with a history of seizure disorder who presented with hallucinations, delusions, and confusion 7d following a mechanical fall at home.  Please see admission H&P from 19 for further details.  She was recently treated for a UTI but had no evidence of this or any other infection on admission.  She was found to be B12 deficient and her TSH was low which was iatrogenic.  She was started on B12 replacement and her levothyroxine was decreased-she should follow-up a TSH with her PCP in 4-6 weeks.    Neurology evaluated the patient.  Her head CT and RPR were negative. Her pramipexole was discontinued.  She will be continued on zonisamide for seizure d/o and will need to follow up with her neurologist at [facility].  Psychiatry evaluated the patient and made some adjustments to her neuroleptics as below.  She is medically stable and will be transferred to the [facility] today.    REVIEW OF SYSTEMS:   no fever or chills. No recent seizure.   No chest pain, palpitations or edema.   Dyspnea with exertion and improved with rest. No cough  No nausea, vomiting or diarrhea or abdominal pain. No dysuria.   Difficulty swallowing due to missing dentures.   Chronic back pain and is due for epidural soon and previously weaned off narcotics.     Objective:   Temp:  [97.6 °F (36.4 °C)-98.4 °F (36.9 °C)] 97.6 °F (36.4 °C)  Heart Rate:  [] 73  Resp:  [18-20] 20  BP: (109-134)/(69-94) 109/69   SpO2:  [96 %-98 %] 96 %  on    Device (Oxygen Therapy): room air  No intake or output data in the 24  hours ending 09/05/19 0838  There is no height or weight on file to calculate BMI.  There were no vitals filed for this visit.  Weight change:     Physical Exam   Constitutional: No distress.   HENT:   Head: Atraumatic.   Nose: Nose normal.   Edentulous     Eyes: Conjunctivae are normal.   Cardiovascular: Normal rate and regular rhythm.   No murmur heard.  Pulmonary/Chest: Effort normal. She has no wheezes. She has no rales.   Abdominal: Soft. Bowel sounds are normal. There is no tenderness.   Musculoskeletal: She exhibits deformity (scolosis). She exhibits no edema.   Neurological: She is alert.   Skin: Skin is warm and dry. She is not diaphoretic.       Results Review:      Results from last 7 days   Lab Units 09/05/19  0606 09/03/19  0502 09/02/19  0632 09/01/19  0541 08/31/19  2253 08/31/19  1444   SODIUM mmol/L 141 135* 142 134* 134* 134*   POTASSIUM mmol/L 4.0 3.6 4.2 4.3 4.2 5.1   CHLORIDE mmol/L 106 106 113* 110* 106 102   CO2 mmol/L 16.5* 13.6* 15.5* 13.5* 12.8* 13.9*   BUN mg/dL 18 15 20 39* 44* 48*   CREATININE mg/dL 0.73 0.71 0.81 1.81* 2.25* 2.85*   GLUCOSE mg/dL 77 90 89 92 98 101*   CALCIUM mg/dL 9.2 9.0 8.6 8.6 8.4* 9.2   AST (SGOT) U/L 19  --   --  12  --  16   ALT (SGPT) U/L 11  --   --  <5  --  6     Estimated Creatinine Clearance: 68.8 mL/min (by C-G formula based on SCr of 0.73 mg/dL).          Results from last 7 days   Lab Units 08/31/19  1444   TROPONIN T ng/mL <0.010         Results from last 7 days   Lab Units 09/05/19  0606 08/31/19  1444   TSH uIU/mL 0.042* 0.035*     Results from last 7 days   Lab Units 08/31/19  1444   MAGNESIUM mg/dL 1.9           Invalid input(s): LDLCALC  Results from last 7 days   Lab Units 09/05/19  0606 09/03/19  0502 09/02/19  0632 09/01/19  0541 08/31/19  1444   WBC 10*3/mm3 4.72 4.78 4.00 4.93 6.12   HEMOGLOBIN g/dL 12.8 11.6* 11.9* 12.5 14.2   HEMATOCRIT % 41.8 35.5 37.3 39.4 43.7   PLATELETS 10*3/mm3 180 175 165 183 213   MCV fL 94.8 89.4 91.4 91.8 89.7   MCH  pg 29.0 29.2 29.2 29.1 29.2   MCHC g/dL 30.6* 32.7 31.9 31.7 32.5   RDW % 14.8 14.4 14.5 14.4 14.1   RDW-SD fl 52.0 46.9 48.6 48.3 46.7   MPV fL 10.6 10.8 10.7 11.1 10.9   NEUTROPHIL % % 51.5  --   --  63.7 68.5   LYMPHOCYTE % % 36.4  --   --  24.1 19.9   MONOCYTES % % 11.7  --   --  11.8 11.3   EOSINOPHIL % % 0.0*  --   --  0.0* 0.0*   BASOPHIL % % 0.2  --   --  0.0 0.0   IMM GRAN % % 0.2  --   --  0.4 0.3   NEUTROS ABS 10*3/mm3 2.43  --   --  3.14 4.19   LYMPHS ABS 10*3/mm3 1.72  --   --  1.19 1.22   MONOS ABS 10*3/mm3 0.55  --   --  0.58 0.69   EOS ABS 10*3/mm3 0.00  --   --  0.00 0.00   BASOS ABS 10*3/mm3 0.01  --   --  0.00 0.00   IMMATURE GRANS (ABS) 10*3/mm3 0.01  --   --  0.02 0.02   NRBC /100 WBC 0.0  --   --  0.0 0.0         Results from last 7 days   Lab Units 08/31/19  2348 08/31/19  1655   PH, ARTERIAL pH units 7.316* 7.329*   PO2 ART mm Hg 111.2* 94.6   PCO2, ARTERIAL mm Hg 21.7* 19.7*   HCO3 ART mmol/L 11.1* 10.4*     Results from last 7 days   Lab Units 08/31/19  1640   LACTATE mmol/L 1.5                     Results from last 7 days   Lab Units 08/31/19  1548   NITRITE UA  Negative   WBC UA /HPF 0-2   BACTERIA UA /HPF Trace*   SQUAM EPITHEL UA /HPF 7-12*           Imaging:  Imaging Results (last 24 hours)     ** No results found for the last 24 hours. **          I reviewed the patient's new clinical results and medications.         Medication Review:   Scheduled Meds:  aspirin 81 mg Oral Daily   carvedilol 6.25 mg Oral BID With Meals   cyanocobalamin 1,000 mcg Intramuscular Q28 Days   levothyroxine 88 mcg Oral Q AM   montelukast 10 mg Oral Nightly   pantoprazole 40 mg Oral Q AM   potassium chloride 40 mEq Oral Daily   QUEtiapine 100 mg Oral Nightly   vitamin D 50,000 Units Oral Q7 Days   zonisamide 100 mg Oral Daily     Continuous Infusions:   PRN Meds:.•  albuterol  •  loperamide  •  magnesium hydroxide  •  nitroglycerin  •  OLANZapine    Problem List:     Active Hospital Problems    Diagnosis  POA    • Psychotic disorder (CMS/HCC) [F29]  Yes   • B12 deficiency [E53.8]  Yes   • Seizures (CMS/HCC) [R56.9]  Yes   • Hypothyroidism (acquired) [E03.9]  Yes   • Chronic back pain [M54.9, G89.29]  Yes      Resolved Hospital Problems   No resolved problems to display.       Assessment and Plan:     ·   Hypothyroidism (acquired)- recent TSH low and med adjusted.  Continue levothyroxine 88 mcg. Will need repeat TSH in 4-6 weeks with PCP.   ·   Seizures (CMS/HCC)- no recent seizure activity. Continue zonesamide. Follow up with neurologist as outpatient  ·   B12 deficiency- recently started on oral replacement and given injection today. Would consider repeat injection in 1 month and follow up with PCP.   ·   Psychotic disorder (CMS/HCC)/delerium- management per psychiatry team. Getting neuropsychological evaluation. On Seroquel and PRN Zyprexa. Qtc 8/31/19 was 452.  · Chronic back pain. PRN tylenol. Encouraged to follow up for planned epidurals as outpatient.   · Change diet to soft texture given edentulous and difficulty chewing normal foods.     The patient seems medically stable at this time.  There are no medical issues which need to be addressed while she is under psychiatric care and may continue to follow up with her PCP as an outpatient. Will sign off, please call if any questions or concerns    I discussed the patients findings and my recommendations with patient and nursing staff.    DANISH Guillaume  09/05/19  8:38 AM

## 2019-09-05 NOTE — PLAN OF CARE
Problem: Skin Injury Risk (Adult)  Goal: Identify Related Risk Factors and Signs and Symptoms  Outcome: Outcome(s) achieved Date Met: 09/05/19 09/05/19 0622   Skin Injury Risk (Adult)   Related Risk Factors (Skin Injury Risk) advanced age;cognitive impairment;mobility impaired       Problem: Fall Risk (Adult)  Goal: Identify Related Risk Factors and Signs and Symptoms  Outcome: Outcome(s) achieved Date Met: 09/05/19 09/05/19 0622   Fall Risk (Adult)   Related Risk Factors (Fall Risk) age-related changes;gait/mobility problems   Signs and Symptoms (Fall Risk) presence of risk factors

## 2019-09-05 NOTE — PROGRESS NOTES
Case Management Discharge Note    Final Note: transfer to [facility]    Destination      No service has been selected for the patient.      Durable Medical Equipment      No service has been selected for the patient.      Dialysis/Infusion      No service has been selected for the patient.      Home Medical Care      No service has been selected for the patient.      Therapy      No service has been selected for the patient.      Community Resources      No service has been selected for the patient.             Final Discharge Disposition Code: 65 - psychiatric hospital or unit

## 2019-09-06 VITALS
SYSTOLIC BLOOD PRESSURE: 95 MMHG | DIASTOLIC BLOOD PRESSURE: 68 MMHG | HEART RATE: 77 BPM | TEMPERATURE: 97.2 F | RESPIRATION RATE: 18 BRPM | OXYGEN SATURATION: 97 %

## 2019-09-06 PROCEDURE — 97110 THERAPEUTIC EXERCISES: CPT

## 2019-09-06 PROCEDURE — 97165 OT EVAL LOW COMPLEX 30 MIN: CPT | Performed by: OCCUPATIONAL THERAPIST

## 2019-09-06 PROCEDURE — 25010000002 CYANOCOBALAMIN PER 1000 MCG: Performed by: SPECIALIST

## 2019-09-06 RX ORDER — QUETIAPINE FUMARATE 100 MG/1
100 TABLET, FILM COATED ORAL NIGHTLY
Qty: 30 TABLET | Refills: 1 | Status: SHIPPED | OUTPATIENT
Start: 2019-09-06

## 2019-09-06 RX ORDER — QUETIAPINE FUMARATE 100 MG/1
100 TABLET, FILM COATED ORAL NIGHTLY
Qty: 30 TABLET | Refills: 0 | Status: SHIPPED | OUTPATIENT
Start: 2019-09-06 | End: 2019-09-06

## 2019-09-06 RX ORDER — CYANOCOBALAMIN 1000 UG/ML
1000 INJECTION, SOLUTION INTRAMUSCULAR; SUBCUTANEOUS ONCE
Status: COMPLETED | OUTPATIENT
Start: 2019-09-06 | End: 2019-09-06

## 2019-09-06 RX ORDER — LEVOTHYROXINE SODIUM 88 UG/1
88 TABLET ORAL DAILY
Qty: 30 TABLET | Refills: 1 | Status: SHIPPED | OUTPATIENT
Start: 2019-09-06

## 2019-09-06 RX ORDER — QUETIAPINE FUMARATE 25 MG/1
25 TABLET, FILM COATED ORAL NIGHTLY
Status: ON HOLD | COMMUNITY
End: 2019-09-06 | Stop reason: SDUPTHER

## 2019-09-06 RX ORDER — PRAMIPEXOLE DIHYDROCHLORIDE 0.12 MG/1
0.12 TABLET ORAL 2 TIMES DAILY
COMMUNITY
End: 2019-09-06

## 2019-09-06 RX ORDER — LEVOTHYROXINE SODIUM 0.1 MG/1
100 TABLET ORAL DAILY
COMMUNITY
End: 2019-09-06

## 2019-09-06 RX ORDER — CARVEDILOL 3.12 MG/1
3.12 TABLET ORAL 2 TIMES DAILY WITH MEALS
COMMUNITY

## 2019-09-06 RX ORDER — LANOLIN ALCOHOL/MO/W.PET/CERES
1000 CREAM (GRAM) TOPICAL DAILY
COMMUNITY
Start: 2019-09-06

## 2019-09-06 RX ADMIN — PANTOPRAZOLE SODIUM 40 MG: 40 TABLET, DELAYED RELEASE ORAL at 08:58

## 2019-09-06 RX ADMIN — CYANOCOBALAMIN 1000 MCG: 1000 INJECTION, SOLUTION INTRAMUSCULAR; SUBCUTANEOUS at 13:40

## 2019-09-06 RX ADMIN — ACETAMINOPHEN 650 MG: 325 TABLET, FILM COATED ORAL at 09:02

## 2019-09-06 RX ADMIN — ASPIRIN 81 MG: 81 TABLET, CHEWABLE ORAL at 08:59

## 2019-09-06 RX ADMIN — POTASSIUM CHLORIDE 40 MEQ: 750 CAPSULE, EXTENDED RELEASE ORAL at 08:59

## 2019-09-06 RX ADMIN — ZONISAMIDE 100 MG: 100 CAPSULE ORAL at 09:00

## 2019-09-06 RX ADMIN — LEVOTHYROXINE SODIUM 88 MCG: 88 TABLET ORAL at 08:58

## 2019-09-06 NOTE — THERAPY TREATMENT NOTE
Inpatient Rehabilitation - Physical Therapy Treatment Note  [facility]     Patient Name: Lana Frankel  : 1945  MRN: [id number]    Today's Date: 2019                 Admit Date: 2019      Visit Dx:    No diagnosis found.    Patient Active Problem List   Diagnosis   • Hallucinations   • Chronic back pain   • Coronary artery disease   • Hypothyroidism (acquired)   • Seizures (CMS/HCC)   • CECILE (acute kidney injury) (CMS/HCC)   • Hyponatremia   • Metabolic acidosis   • Head injury, acute, sequela   • Weakness   • Metabolic encephalopathy   • B12 deficiency   • Psychotic disorder (CMS/HCC)       Therapy Treatment         Physical Therapy Education     Title: PT OT SLP Therapies (In Progress)     Topic: Physical Therapy (In Progress)     Point: Precautions (In Progress)     Learning Progress Summary           Patient Acceptance, E, NR by MD at 2019 10:01 AM    Acceptance, E, VU by MD at 2019  3:25 PM                               User Key     Initials Effective Dates Name Provider Type Discipline    MD 18 -  Blanca Monique, PT Physical Therapist PT                  PT Recommendation and Plan  Anticipated Discharge Disposition (PT): home with assist  Plan of Care Reviewed With: patient           Outcome Measures     Row Name 19 1000 19 1500 19 1200       How much help from another person do you currently need...    Turning from your back to your side while in flat bed without using bedrails?  4  -MD  4  -MD  --    Moving from lying on back to sitting on the side of a flat bed without bedrails?  4  -MD  4  -MD  --    Moving to and from a bed to a chair (including a wheelchair)?  3  -MD  3  -MD  --    Standing up from a chair using your arms (e.g., wheelchair, bedside chair)?  3  -MD  3  -MD  --    Climbing 3-5 steps with a railing?  3  -MD  3  -MD  --    To walk in hospital room?  3  -MD  3  -MD  --    AM-PAC 6 Clicks Score (PT)  20  -MD  20  -MD  --       How much help from  another is currently needed...    Putting on and taking off regular lower body clothing?  --  --  3  -SK    Bathing (including washing, rinsing, and drying)  --  --  3  -SK    Toileting (which includes using toilet bed pan or urinal)  --  --  3  -SK    Putting on and taking off regular upper body clothing  --  --  3  -SK    Taking care of personal grooming (such as brushing teeth)  --  --  3  -SK    Eating meals  --  --  4  -SK    AM-PAC 6 Clicks Score (OT)  --  --  19  -SK       Functional Assessment    Outcome Measure Options  AM-PAC 6 Clicks Basic Mobility (PT)  -MD  AM-PAC 6 Clicks Basic Mobility (PT)  -MD  AM-PAC 6 Clicks Daily Activity (OT)  -SK      User Key  (r) = Recorded By, (t) = Taken By, (c) = Cosigned By    Initials Name Provider Type    Blanca Sibley, PT Physical Therapist    SK Gloria Lucas, OT Occupational Therapist             Time Calculation:     PT Charges     Row Name 09/06/19 0957             Time Calculation    Start Time  0945  -MD      Stop Time  0955  -MD      Time Calculation (min)  10 min  -MD      PT Received On  09/06/19  -MD      PT - Next Appointment  09/09/19  -MD        User Key  (r) = Recorded By, (t) = Taken By, (c) = Cosigned By    Initials Name Provider Type    Blanca Sibley, PT Physical Therapist          Therapy Charges for Today     Code Description Service Date Service Provider Modifiers Qty    51597248083 HC PT EVAL LOW COMPLEXITY 2 9/5/2019 Blanca Monique, PT GP 1    56005059886 HC PT THER PROC EA 15 MIN 9/6/2019 Blanca Monique, PT GP 1            PT G-Codes  Outcome Measure Options: AM-PAC 6 Clicks Basic Mobility (PT)  AM-PAC 6 Clicks Score (PT): 20      Blanca Monique PT  9/6/2019

## 2019-09-06 NOTE — PLAN OF CARE
Problem: Patient Care Overview  Goal: Plan of Care Review   09/06/19 0783   Coping/Psychosocial   Plan of Care Reviewed With patient   OTHER   Outcome Summary Pt seen this date for OT eval with generalized weakness and balance deficits. Pt able to perform ADLs this date with [facility]. Pt would benefit from OT to address deficits.

## 2019-09-06 NOTE — DISCHARGE SUMMARY
DATES OF ADMISSION: 9/4/2019-9/6/2019    REASON FOR ADMISSION: The patient is a 73-year-old white female admitted with increasing delusional thinking related to delirium.    HOSP COURSE: See chart patient was admitted to the crisis management unit and continued on patient prescribed Seroquel 100 mg at nighttime.  Other medications were also continued.  The patient showed rapid resolution of her delirium.  When seen by this physician on the morning of 9/6/2019 the patient was visiting with her  and denied any psychotic thinking delusional thinking visual or auditory hallucinations etc..  Results of neuropsychological evaluation undertaken at [facility] earlier this year were reviewed prior to the patient's discharge.  Patient's  reported that she had returned to baseline and requested that discharge to take place.  It was so ordered.    DX: Delirium, resolved, hypertension, seizure disorder  DISP: A full listing the patient's medications is provided below.  Follow-up with a place with [facility] mental health resources.        PROGNOSIS: Good       Discharge Medications      Continue These Medications      Instructions Start Date   albuterol sulfate  (90 Base) MCG/ACT inhaler  Commonly known as:  PROVENTIL HFA;VENTOLIN HFA;PROAIR HFA   2 puffs, Inhalation, Every 6 Hours PRN      aspirin 81 MG EC tablet   81 mg, Oral, Daily      atorvastatin 10 MG tablet  Commonly known as:  LIPITOR   10 mg, Oral, Daily      carvedilol 6.25 MG tablet  Commonly known as:  COREG   6.25 mg, Oral, 2 Times Daily With Meals      cyanocobalamin 1000 MCG/ML injection   1,000 mcg, Intramuscular, Daily      ergocalciferol 8000 UNIT/ML drops  Commonly known as:  DRISDOL   Oral, Daily      vitamin D 07164 units capsule capsule  Commonly known as:  ERGOCALCIFEROL   50,000 Units, Oral, Weekly      levothyroxine 88 MCG tablet  Commonly known as:  SYNTHROID, LEVOTHROID   88 mcg, Oral, Daily      montelukast 10 MG  tablet  Commonly known as:  SINGULAIR   10 mg, Oral, Nightly      nitroglycerin 0.4 MG SL tablet  Commonly known as:  NITROSTAT   0.4 mg, Sublingual, Every 5 Minutes PRN, Take no more than 3 doses in 15 minutes.       pantoprazole 40 MG EC tablet  Commonly known as:  PROTONIX   40 mg, Oral, Daily      potassium chloride 10 MEQ ER tablet  Commonly known as:  K-DUR,KLOR-CON   20 mEq, Oral, Daily      QUEtiapine 100 MG tablet  Commonly known as:  SEROquel   100 mg, Oral, Nightly      zonisamide 100 MG capsule  Commonly known as:  ZONEGRAN   200 mg, Oral, Nightly         Stop These Medications    OLANZapine 5 MG tablet  Commonly known as:  zyPREXA

## 2019-09-06 NOTE — PLAN OF CARE
Problem: Patient Care Overview  Goal: Plan of Care Review  Outcome: Ongoing (interventions implemented as appropriate)   09/05/19 2114   Coping/Psychosocial   Plan of Care Reviewed With patient   Coping/Psychosocial   Patient Agreement with Plan of Care agrees   Plan of Care Review   Progress improving   OTHER   Outcome Summary PT is pleasant, cooperative and compliant with care. A / O x4. PT denies all mental health issues. Verbalized 5/10 lower back pain. Admin Tylenol for comfort, See MAR. Requires moderate assist with ADLs x 1. Will continue to monitror behavior, provide support and a safe environment.     Goal: Individualization and Mutuality  Outcome: Ongoing (interventions implemented as appropriate)   09/05/19 2114   Personal Strengths/Vulnerabilities   Patient Personal Strengths expressive of emotions;expressive of needs;family/social support;motivated for recovery;motivated for treatment;medication/treatment adherence     Goal: Discharge Needs Assessment  Outcome: Ongoing (interventions implemented as appropriate)    Goal: Interprofessional Rounds/Family Conf  Outcome: Ongoing (interventions implemented as appropriate)      Problem: Overarching Goals (Adult)  Goal: Adheres to Safety Considerations for Self and Others  Outcome: Ongoing (interventions implemented as appropriate)   09/05/19 2114   Overarching Goals (Adult)   Adheres to Safety Considerations for Self and Others making progress toward outcome     Goal: Optimized Coping Skills in Response to Life Stressors  Outcome: Ongoing (interventions implemented as appropriate)    Goal: Develops/Participates in Therapeutic [place] to Support Successful Transition  Outcome: Ongoing (interventions implemented as appropriate)

## 2019-09-06 NOTE — PROGRESS NOTES
Consult received and chart reviewed.  Will contact unit to schedule neuropsych testing.  Previous neuropsych evaluation completed at [facility] in February 2019.  Would be useful if this evaluation could be obtained for comparison.

## 2019-09-06 NOTE — PROGRESS NOTES
Continued Stay Note  [facility]     Patient Name: Lana Frankel  MRN: [id number]  Today's Date: 9/6/2019    Admit Date: 9/4/2019    Discharge Plan     Row Name 09/06/19 1445       Plan    Final Discharge Disposition Code  01 - home or self-care    Final Note  Pt was discharged per Dr. Harkins's orders.  SW met w/pt & spouse to discuss follow-up care.  Pt will receive mental health svcs from Dr. Vahid Lund.  Pt has an appt on 9/21.  Pt was transported home by her spouse.        Discharge Codes    No documentation.       Expected Discharge Date and Time     Expected Discharge Date Expected Discharge Time    Sep 6, 2019             DHRUV Mejia

## 2019-09-06 NOTE — PLAN OF CARE
Problem: Patient Care Overview  Goal: Plan of Care Review   09/06/19 1014   Coping/Psychosocial   Plan of Care Reviewed With patient   OTHER   Outcome Summary Pt progressing w ambulation and transfers this am. Pt endurance w ther ex decreased at this time.

## 2019-09-06 NOTE — THERAPY EVALUATION
Acute Care - Occupational Therapy Initial Evaluation  [facility]     Patient Name: Lana Frankel  : 1945  MRN: [id number]  Today's Date: 2019        Referring Physician: Torin    Admit Date: 2019     No diagnosis found.  Patient Active Problem List   Diagnosis   • Hallucinations   • Chronic back pain   • Coronary artery disease   • Hypothyroidism (acquired)   • Seizures (CMS/HCC)   • CECILE (acute kidney injury) (CMS/HCC)   • Hyponatremia   • Metabolic acidosis   • Head injury, acute, sequela   • Weakness   • Metabolic encephalopathy   • B12 deficiency   • Psychotic disorder (CMS/HCC)     Past Medical History:   Diagnosis Date   • Chronic back pain    • Coronary artery disease    • Disease of thyroid gland    • Myocardial infarction (CMS/HCC)    • Seizures (CMS/HCC)      Past Surgical History:   Procedure Laterality Date   • CHOLECYSTECTOMY     • HYSTERECTOMY     • NASAL SEPTUM SURGERY     • REPLACEMENT TOTAL KNEE     • THYROID SURGERY            OT ASSESSMENT FLOWSHEET (last 12 hours)      Occupational Therapy Evaluation     Row Name 19 1324                   OT Evaluation Time/Intention    Subjective Information  no complaints  -SG        Document Type  evaluation  -SG        Mode of Treatment  individual therapy;occupational therapy  -SG        Patient Effort  good  -SG        Symptoms Noted During/After Treatment  none  -SG           General Information    Patient Profile Reviewed?  yes  -SG        Referring Physician  Torin  -ANDRY        Patient Observations  alert;cooperative;agree to therapy  -SG        General Observations of Patient  Pt sitting up in chair on CMU unit  -SG        Prior Level of Function  independent:;ADL's  -SG        Equipment Currently Used at Home  none  -SG        Pertinent History of Current Functional Problem  Psychotic behavior  -SG        Existing Precautions/Restrictions  fall  -SG        Limitations/Impairments  safety/cognitive  -SG        Barriers  to Rehab  cognitive status  -SG           Relationship/Environment    Lives With  spouse  -SG           Cognitive Assessment/Intervention- PT/OT    Orientation Status (Cognition)  oriented to;person  -SG        Follows Commands (Cognition)  follows one step commands;75-90% accuracy  -        Safety Deficit (Cognitive)  mild deficit;insight into deficits/self awareness  -SG           Bed Mobility Assessment/Treatment    Supine-Sit [place] (Bed Mobility)  not tested  -SG        Sit-Supine [place] (Bed Mobility)  not tested  -           Functional Mobility    Functional Mobility- Ind. Level  contact guard assist;verbal cues required  -        Functional Mobility- Comment  Walks HHA from common area to room  -SG           Transfer Assessment/Treatment    Transfer Assessment/Treatment  sit-stand transfer;stand-sit transfer;toilet transfer  -SG           Sit-Stand Transfer    Sit-Stand [place] (Transfers)  verbal cues;contact guard  -SG           Stand-Sit Transfer    Stand-Sit [place] (Transfers)  verbal cues;contact guard  -SG           Toilet Transfer    Type (Toilet Transfer)  sit-stand;stand-sit  -SG        [place] Level (Toilet Transfer)  contact guard;verbal cues  -        Assistive Device (Toilet Transfer)  grab bars/safety frame  -           Grooming Assessment/Training    [place] Level (Grooming)  grooming skills;hair care, combing/brushing;oral care regimen;wash face, hands;contact guard assist;verbal cues  -SG        Grooming Position  sink side;supported standing  -           Toileting Assessment/Training    [place] Level (Toileting)  toileting skills;adjust/manage clothing;perform perineal hygiene;contact guard assist;verbal cues  -        Assistive Devices (Toileting)  grab bar/safety frame  -        Toileting Position  supported sitting;supported standing  -           General ROM    GENERAL ROM COMMENTS  BUE WFL  -SG           MMT (Manual Muscle Testing)     General MMT Comments  3+/5  -SG           Positioning and Restraints    Pre-Treatment Position  sitting in chair/recliner  -SG        Post Treatment Position  chair  -SG        In Chair  sitting;with nsg  -SG           Pain Scale: Numbers Pre/Post-Treatment    Pain Scale: Numbers, Pretreatment  0/10 - no pain  -SG           OT Goals    Transfer Goal Selection (OT)  transfer, OT goal 1  -SG        Problem Specific Goal Selection (OT)  problem specific goal 1, OT  -SG        Additional Documentation  Strength Goal Selection (OT) (Row);Problem Specific Goal Selection (OT) (Row)  -SG           Transfer Goal 1 (OT)    Activity/Assistive Device (Transfer Goal 1, OT)  sit-to-stand/stand-to-sit;toilet  -SG        [place] Level/Cues Needed (Transfer Goal 1, OT)  standby assist  -SG        Time Frame (Transfer Goal 1, OT)  short term goal (STG);1 week  -SG        Progress/Outcome (Transfer Goal 1, OT)  goal ongoing  -SG           Strength Goal 1 (OT)    Strength Goal 1 (OT)  Pt to increase BUE strength to 4-/5 to assist with ADLS.  -SG        Time Frame (Strength Goal 1, OT)  short term goal (STG);1 week  -SG        Progress/Outcome (Strength Goal 1, OT)  goal ongoing  -SG           Problem Specific Goal 1 (OT)    Problem Specific Goal 1 (OT)  Pt to perform ADLs with SBA.  -SG        Time Frame (Problem Specific Goal 1, OT)  short term goal (STG);1 week  -SG        Progress/Outcome (Problem Specific Goal 1, OT)  goal ongoing  -SG          User Key  (r) = Recorded By, (t) = Taken By, (c) = Cosigned By    Initials Name Effective Dates    Barb Conklin OTR 12/26/18 -          Occupational Therapy Education     Title: PT OT SLP Therapies (In Progress)     Topic: Occupational Therapy (In Progress)     Point: ADL training (Done)     Description: Instruct learner(s) on proper safety adaptation and remediation techniques during self care or transfers.   Instruct in proper use of assistive devices.    Learning Progress  Summary           Patient Acceptance, E,TB, VU,NR by  at 9/6/2019  1:32 PM                               User Key     Initials Effective Dates Name Provider Type Discipline     12/26/18 -  Barb Emery OTR Occupational Therapist OT                  OT Recommendation and Plan     Plan of Care Review  Plan of Care Reviewed With: patient  Plan of Care Reviewed With: patient  Outcome Summary: Pt seen this date for OT eval with generalized weakness and balance deficits. Pt able to perform ADLs this date with CGA. Pt would benefit from OT to address deficits.    Outcome Measures     Row Name 09/06/19 1300 09/06/19 1000 09/05/19 1500       How much help from another person do you currently need...    Turning from your back to your side while in flat bed without using bedrails?  --  4  -MD  4  -MD    Moving from lying on back to sitting on the side of a flat bed without bedrails?  --  4  -MD  4  -MD    Moving to and from a bed to a chair (including a wheelchair)?  --  3  -MD  3  -MD    Standing up from a chair using your arms (e.g., wheelchair, bedside chair)?  --  3  -MD  3  -MD    Climbing 3-5 steps with a railing?  --  3  -MD  3  -MD    To walk in hospital room?  --  3  -MD  3  -MD    AM-PAC 6 Clicks Score (PT)  --  20  -MD  20  -MD       How much help from another is currently needed...    Putting on and taking off regular lower body clothing?  3  -SG  --  --    Bathing (including washing, rinsing, and drying)  3  -SG  --  --    Toileting (which includes using toilet bed pan or urinal)  3  -SG  --  --    Putting on and taking off regular upper body clothing  3  -SG  --  --    Taking care of personal grooming (such as brushing teeth)  3  -SG  --  --    Eating meals  4  -SG  --  --    AM-PAC 6 Clicks Score (OT)  19  -SG  --  --       Functional Assessment    Outcome Measure Options  AM-PAC 6 Clicks Daily Activity (OT)  -SG  AM-PAC 6 Clicks Basic Mobility (PT)  -MD BENDERPAC 6 Clicks Basic Mobility (PT)  -MD       User Key  (r) = Recorded By, (t) = Taken By, (c) = Cosigned By    Initials Name Provider Type    Barb Conklin OTR Occupational Therapist    MD Kayden, JORDYN Rios Physical Therapist          Time Calculation:   Time Calculation- OT     Row Name 09/06/19 1332             Time Calculation- OT    OT Start Time  0934  -SG      OT Stop Time  0944  -      OT Time Calculation (min)  10 min  -      OT Received On  09/06/19  -        User Key  (r) = Recorded By, (t) = Taken By, (c) = Cosigned By    Initials Name Provider Type    Barb Conklin OTR Occupational Therapist        Therapy Charges for Today     Code Description Service Date Service Provider Modifiers Qty    [id number] HC OT EVAL LOW COMPLEXITY 2 9/6/2019 Barb Emery OTR GO 1               BRUCE Lemus  9/6/2019

## 2019-09-06 NOTE — PLAN OF CARE
Problem: Patient Care Overview  Goal: Plan of Care Review  Outcome: Ongoing (interventions implemented as appropriate)   09/05/19 2114 09/06/19 0400 09/06/19 0703   Coping/Psychosocial   Plan of Care Reviewed With --  patient --    Coping/Psychosocial   Patient Agreement with Plan of Care --  agrees --    Plan of Care Review   Progress improving --  --    OTHER   Outcome Summary --  --  Pt has been pleasant, cooperative with care and compliant with meds. Pt A& O x 2- disoriented to time and situation. Pt denies and SI/HI/AVH. Pt slept most of the evening. 9/6/19 0655       Problem: Overarching Goals (Adult)  Goal: Adheres to Safety Considerations for Self and Others  Outcome: Ongoing (interventions implemented as appropriate)   09/06/19 0703   Overarching Goals (Adult)   Adheres to Safety Considerations for Self and Others making progress toward outcome     Goal: Optimized Coping Skills in Response to Life Stressors  Outcome: Ongoing (interventions implemented as appropriate)   09/06/19 0703   Overarching Goals (Adult)   Optimized Coping Skills in Response to Life Stressors making progress toward outcome     Goal: Develops/Participates in Therapeutic [place] to Support Successful Transition  Outcome: Ongoing (interventions implemented as appropriate)

## 2019-09-06 NOTE — NURSING NOTE
Pt's previous neuropsych results from pt's stay at [facility] were faxed to unit at 1105. Dr. Harkins reviewed results. Per Dr. Harkins's orders, current consult for neuropsych testing canceled.

## 2019-09-16 ENCOUNTER — INPATIENT HOSPITAL (OUTPATIENT)
Dept: URBAN - METROPOLITAN AREA HOSPITAL 107 | Facility: HOSPITAL | Age: 74
End: 2019-09-16
Payer: MEDICARE

## 2019-09-16 DIAGNOSIS — K25.9 GASTRIC ULCER, UNSPECIFIED AS ACUTE OR CHRONIC, WITHOUT HEMO: ICD-10-CM

## 2019-09-16 DIAGNOSIS — D50.0 IRON DEFICIENCY ANEMIA SECONDARY TO BLOOD LOSS (CHRONIC): ICD-10-CM

## 2019-09-16 DIAGNOSIS — K21.0 GASTRO-ESOPHAGEAL REFLUX DISEASE WITH ESOPHAGITIS: ICD-10-CM

## 2019-09-16 DIAGNOSIS — K26.9 DUODENAL ULCER, UNSPECIFIED AS ACUTE OR CHRONIC, WITHOUT HEM: ICD-10-CM

## 2019-09-16 DIAGNOSIS — K92.1 MELENA: ICD-10-CM

## 2019-09-16 PROCEDURE — 43235 EGD DIAGNOSTIC BRUSH WASH: CPT | Performed by: INTERNAL MEDICINE

## 2019-09-17 ENCOUNTER — INPATIENT HOSPITAL (OUTPATIENT)
Dept: URBAN - METROPOLITAN AREA HOSPITAL 107 | Facility: HOSPITAL | Age: 74
End: 2019-09-17
Payer: MEDICARE

## 2019-09-17 DIAGNOSIS — K92.1 MELENA: ICD-10-CM

## 2019-09-17 DIAGNOSIS — K92.2 GASTROINTESTINAL HEMORRHAGE, UNSPECIFIED: ICD-10-CM

## 2019-09-17 DIAGNOSIS — D62 ACUTE POSTHEMORRHAGIC ANEMIA: ICD-10-CM

## 2019-09-17 PROCEDURE — 99232 SBSQ HOSP IP/OBS MODERATE 35: CPT | Performed by: PHYSICIAN ASSISTANT

## 2019-09-18 PROCEDURE — 99231 SBSQ HOSP IP/OBS SF/LOW 25: CPT | Performed by: PHYSICIAN ASSISTANT

## 2019-09-23 ENCOUNTER — INPATIENT HOSPITAL (OUTPATIENT)
Dept: URBAN - METROPOLITAN AREA HOSPITAL 107 | Facility: HOSPITAL | Age: 74
End: 2019-09-23
Payer: MEDICARE

## 2019-09-23 DIAGNOSIS — K85.90 ACUTE PANCREATITIS WITHOUT NECROSIS OR INFECTION, UNSPECIFIE: ICD-10-CM

## 2019-09-23 DIAGNOSIS — R74.8 ABNORMAL LEVELS OF OTHER SERUM ENZYMES: ICD-10-CM

## 2019-09-23 DIAGNOSIS — R10.13 EPIGASTRIC PAIN: ICD-10-CM

## 2019-09-23 PROCEDURE — 99222 1ST HOSP IP/OBS MODERATE 55: CPT | Performed by: INTERNAL MEDICINE

## 2019-09-24 ENCOUNTER — INPATIENT HOSPITAL (OUTPATIENT)
Dept: URBAN - METROPOLITAN AREA HOSPITAL 107 | Facility: HOSPITAL | Age: 74
End: 2019-09-24
Payer: MEDICARE

## 2019-09-24 DIAGNOSIS — R10.9 UNSPECIFIED ABDOMINAL PAIN: ICD-10-CM

## 2019-09-24 DIAGNOSIS — K92.1 MELENA: ICD-10-CM

## 2019-09-24 DIAGNOSIS — K85.90 ACUTE PANCREATITIS WITHOUT NECROSIS OR INFECTION, UNSPECIFIE: ICD-10-CM

## 2019-09-24 PROCEDURE — 99232 SBSQ HOSP IP/OBS MODERATE 35: CPT | Performed by: PHYSICIAN ASSISTANT

## 2019-09-25 PROCEDURE — 99231 SBSQ HOSP IP/OBS SF/LOW 25: CPT | Performed by: PHYSICIAN ASSISTANT

## 2019-12-12 ENCOUNTER — AMBULATORY SURGICAL CENTER (OUTPATIENT)
Dept: URBAN - METROPOLITAN AREA SURGERY 17 | Facility: SURGERY | Age: 74
End: 2019-12-12
Payer: MEDICARE

## 2019-12-12 ENCOUNTER — OFFICE (OUTPATIENT)
Dept: URBAN - METROPOLITAN AREA PATHOLOGY 4 | Facility: PATHOLOGY | Age: 74
End: 2019-12-12
Payer: MEDICARE

## 2019-12-12 VITALS
RESPIRATION RATE: 13 BRPM | SYSTOLIC BLOOD PRESSURE: 135 MMHG | HEIGHT: 67 IN | DIASTOLIC BLOOD PRESSURE: 35 MMHG | RESPIRATION RATE: 16 BRPM | DIASTOLIC BLOOD PRESSURE: 58 MMHG | SYSTOLIC BLOOD PRESSURE: 110 MMHG | TEMPERATURE: 97.4 F | HEART RATE: 72 BPM | OXYGEN SATURATION: 99 % | SYSTOLIC BLOOD PRESSURE: 90 MMHG | DIASTOLIC BLOOD PRESSURE: 40 MMHG | RESPIRATION RATE: 10 BRPM | OXYGEN SATURATION: 93 % | HEART RATE: 64 BPM | OXYGEN SATURATION: 96 % | HEART RATE: 71 BPM | DIASTOLIC BLOOD PRESSURE: 77 MMHG | OXYGEN SATURATION: 100 % | SYSTOLIC BLOOD PRESSURE: 104 MMHG | HEART RATE: 65 BPM | SYSTOLIC BLOOD PRESSURE: 99 MMHG | DIASTOLIC BLOOD PRESSURE: 46 MMHG | WEIGHT: 177 LBS | OXYGEN SATURATION: 98 % | RESPIRATION RATE: 20 BRPM | RESPIRATION RATE: 14 BRPM | HEART RATE: 83 BPM | DIASTOLIC BLOOD PRESSURE: 49 MMHG | TEMPERATURE: 97.2 F

## 2019-12-12 DIAGNOSIS — K25.9 GASTRIC ULCER, UNSPECIFIED AS ACUTE OR CHRONIC, WITHOUT HEMO: ICD-10-CM

## 2019-12-12 DIAGNOSIS — K29.50 UNSPECIFIED CHRONIC GASTRITIS WITHOUT BLEEDING: ICD-10-CM

## 2019-12-12 LAB
GI HISTOLOGY: A. SELECT: (no result)
GI HISTOLOGY: PDF REPORT: (no result)

## 2019-12-12 PROCEDURE — 43239 EGD BIOPSY SINGLE/MULTIPLE: CPT | Performed by: INTERNAL MEDICINE

## 2019-12-12 PROCEDURE — 88342 IMHCHEM/IMCYTCHM 1ST ANTB: CPT | Performed by: INTERNAL MEDICINE

## 2019-12-12 PROCEDURE — 88305 TISSUE EXAM BY PATHOLOGIST: CPT | Performed by: INTERNAL MEDICINE

## 2020-07-29 ENCOUNTER — ON CAMPUS - OUTPATIENT (OUTPATIENT)
Dept: URBAN - METROPOLITAN AREA HOSPITAL 108 | Facility: HOSPITAL | Age: 75
End: 2020-07-29
Payer: MEDICARE

## 2020-07-29 DIAGNOSIS — K85.90 ACUTE PANCREATITIS WITHOUT NECROSIS OR INFECTION, UNSPECIFIE: ICD-10-CM

## 2020-07-29 DIAGNOSIS — K86.1 OTHER CHRONIC PANCREATITIS: ICD-10-CM

## 2020-07-29 PROCEDURE — 99214 OFFICE O/P EST MOD 30 MIN: CPT | Performed by: INTERNAL MEDICINE
